# Patient Record
Sex: FEMALE | Race: BLACK OR AFRICAN AMERICAN | Employment: UNEMPLOYED | URBAN - METROPOLITAN AREA
[De-identification: names, ages, dates, MRNs, and addresses within clinical notes are randomized per-mention and may not be internally consistent; named-entity substitution may affect disease eponyms.]

---

## 2017-03-30 ENCOUNTER — OFFICE VISIT (OUTPATIENT)
Dept: NEUROLOGY | Age: 47
End: 2017-03-30

## 2017-03-30 VITALS
HEIGHT: 60 IN | SYSTOLIC BLOOD PRESSURE: 140 MMHG | OXYGEN SATURATION: 98 % | DIASTOLIC BLOOD PRESSURE: 80 MMHG | BODY MASS INDEX: 44.68 KG/M2 | WEIGHT: 227.6 LBS | TEMPERATURE: 98.3 F | HEART RATE: 74 BPM

## 2017-03-30 DIAGNOSIS — R40.4 TRANSIENT ALTERATION OF AWARENESS: ICD-10-CM

## 2017-03-30 DIAGNOSIS — R20.2 PARESTHESIA: ICD-10-CM

## 2017-03-30 DIAGNOSIS — R41.3 MEMORY LOSS: ICD-10-CM

## 2017-03-30 DIAGNOSIS — R51.9 CHRONIC DAILY HEADACHE: Primary | ICD-10-CM

## 2017-03-30 RX ORDER — TOPIRAMATE 100 MG/1
100 TABLET, COATED ORAL 2 TIMES DAILY
Qty: 60 TAB | Refills: 5 | Status: SHIPPED | OUTPATIENT
Start: 2017-03-30 | End: 2017-05-16 | Stop reason: SDUPTHER

## 2017-03-30 NOTE — PROGRESS NOTES
[facility]   [address] [facility], [address] [facility], [address]      Date:     Name:  Hansa Reynoso  :  1970  MRN:  [id number]     PCP:  No primary care provider on file. Chief Complaint   Patient presents with    Headache     Follow-up, denies currently         HISTORY OF PRESENT ILLNESS:  Patient reports continuing episodes of numbness starting in the time spreading down the anterior neck to the chest, lasting for hours approximately every night when going to bed   No new jaw clenchingShe did her cheeks that her tongue was numb. She did not lose consciousness. She reports her speech was off per her family members and she had a mild right frontal headache. She denied any postictal confusion. She had a similar episode several years ago with loss of consciousness. Workup at that time showed abnormal EEG, but negative MRI. Patient and was placed on Keppra, but did not tolerate. She reports improvement in headaches since institution of Topamax. She did worse off the Topamax and will wants to restart. Her primary care has given her 50 mg which she takes twice a day but still has headaches in the facial tingling. She still has lapses of awareness. She returns after an absence of over a year. She has  and can now afford the medication. She was on brand name Topamax for tolerance. she notes increase tingling with statin      General ROS: negative  Psychological ROS: positive for - hallucinations and memory difficulties  Cardiovascular ROS: no chest pain or dyspnea on exertion  Gastrointestinal ROS: no abdominal pain, change in bowel habits, or black or bloody stools  Musculoskeletal ROS: negative  Neurological ROS: positive for - dizziness, memory loss and numbness/tingling  Dermatological ROS: negative     Current Outpatient Prescriptions   Medication Sig    TOPAMAX 100 mg tablet Take 1 Tab by mouth two (2) times a day.     Omeprazole delayed release (PRILOSEC D/R) 20 mg tablet Take 20 mg by mouth.  temazepam (RESTORIL) 15 mg capsule Take  by mouth nightly as needed for Sleep.  QUEtiapine SR (SEROQUEL XR) 150 mg sr tablet Take 150 mg by mouth three (3) times daily.  fluticasone (FLONASE) 50 mcg/actuation nasal spray 2 Sprays by Both Nostrils route daily as needed for Allergies. Indications: ALLERGIC RHINITIS    OTHER     acetaminophen-codeine (TYLENOL #3) 300-30 mg per tablet Take 1 Tab by mouth every six (6) hours as needed for Pain. Max Daily Amount: 4 Tabs.  hydrochlorothiazide (HYDRODIURIL) 25 mg tablet TAKE ONE TABLET BY MOUTH ONCE DAILY    MULTIVITAMIN WITH MINERALS (HAIR,SKIN & NAILS PO) Take  by mouth.  valACYclovir (VALTREX) 500 mg tablet TAKE ONE CAPLET BY MOUTH ONCE DAILY APPOINTMENT REQUIRED BEFORE NEXT FILL.  haloperidol (HALDOL) 10 mg tablet Take 10 mg by mouth two (2) times a day. Indications: SCHIZOPHRENIA    benztropine (COGENTIN) 1 mg tablet Take  by mouth two (2) times a day. No current facility-administered medications for this visit. Allergies   Allergen Reactions    Latex Itching     Latex condoms    Zoloft [Sertraline] Other (comments)     Past Medical History:   Diagnosis Date    Abdominal pain     Anxiety disorder     panic attacks 7 y ago    Constipation     Crohn's disease ([address] 4/1/2010    Dizzy spells     Dyslipidemia     Echocardiogram 10/20/11    Normal. EF 60-65% no WMA    Genital HSV 4/1/2010    Headache(784.0)     Hearing reduced     Memory disorder     Psychiatric disorder     Schizophrenia ([address]     Syncope      Past Surgical History:   Procedure Laterality Date    HX HYSTERECTOMY  2001    HX TONSILLECTOMY  age 17     Social History     Social History    Marital status: SINGLE     Spouse name: N/A    Number of children: N/A    Years of education: N/A     Occupational History    Not on file.      Social History Main Topics    Smoking status: Never Smoker    Smokeless tobacco: Never Used   Hever Hocdipti Alcohol use No    Drug use: No    Sexual activity: Not Currently     Birth control/ protection: Surgical     Other Topics Concern    Caffeine Concern Yes     has decreased to 3 12oz cans of mountain dew    Sleep Concern No    Stress Concern No    Weight Concern No    Exercise Yes     2 days per week, walking    Seat Belt Yes    Self-Exams No     Social History Narrative     Family History   Problem Relation Age of Onset    Diabetes Mother     Arrhythmia Mother     Heart Disease Mother     Seizures Mother     Other Mother      sleep apnea    Stroke Mother     Headache Mother     Heart Disease Father      MI    Heart Attack Father     Hypertension Father     Stroke Father     Cancer Brother      prostate cancer?  Diabetes Brother     Cancer Maternal Aunt      breast cancer    Schizophrenia Maternal Aunt        PHYSICAL EXAMINATION:    Visit Vitals    /80    Pulse 74    Temp 98.3 °F (36.8 °C) (Oral)    Ht 5' (1.524 m)    Wt 103.2 kg (227 lb 9.6 oz)    SpO2 98%    BMI 44.45 kg/m2     General:  Well defined, nourished, and groomed individual in no acute distress. Neck: Supple, nontender, thyroid within normal limits,, no bruits, no pain with resistance to active range of motion. Heart: Regular rate and rhythm, no murmurs, rub, or gallop. Normal S1S2. Lungs:  Clear to auscultation bilaterally with equal chest expansion, no cough, no wheeze  Musculoskeletal:  Extremities revealed no edema and had full range of motion   Psych:  Good mood and bright affect    NEUROLOGICAL EXAMINATION:     Mental Status:   Alert and oriented to person, place, and time with fair recent and remote memory. Attention span and concentration are normal. Speech is fluent with a fair fund of knowledge. Cranial Nerves:    II, III, IV, VI:  Visual acuity grossly intact. Visual fields are normal.    Pupils are equal, round, and reactive to light and accommodation.     Extra-ocular movements are full and fluid. no ptosis or nystagmus. V-XII: Facial features are symmetric, with normal sensation and strength. Motor Examination: Normal tone, bulk, and strength, 5/5 muscle strength throughout. No cogwheel rigidity or clonus present. Coordination:  No resting or intention tremor. Normal gait    [place]:  Steady while walking on toes, heels, and with tandem walking. Normal arm swing. No muscle wasting or fasiculations noted. Lab/Data Review:  Reviewed er notes and studies    ASSESSMENT AND PLAN    ICD-10-CM ICD-9-CM    1. Chronic daily headache R51 784.0    2. Paresthesia R20.2 782.0    3. Memory loss R41.3 780.93    4. Transient alteration of awareness R40.4 780.02        Ladi Katherineamado a 42 y.o. y.o.  who has risk factors including abnormal eeg, chronic headaches and schizophrenia on atypical neuroleptics    Plan:   Reviewed work up  Recommended vit b2 coenzyme q10vit b12 for tingling and vit c if increased topamax resulted in increased tingling not just with headaches  Will increase topamax after warning  Of side effects of increased tingling  I spent 40 minutes with the patient in face-to-face consultation, of which greater than 50% was spent in counseling and coordination of care as described above.

## 2017-03-30 NOTE — MR AVS SNAPSHOT
Visit Information Date & Time Provider Department Dept. Phone Encounter #  
 3/30/2017  9:00 AM Darcie Jimenez  \[facility]\"" [address] [address] [id number] Follow-up Instructions Return in about 1 month (around 4/30/2017). Follow-up and Disposition History Your Appointments 5/16/2017  1:00 PM  
Follow Up with Darcie Jimenez MD  
[address]) Appt Note: 1mon f/u  
 [address] [facility] [address]  
[phone]  
  
   
 [facility] [address] Upcoming Health Maintenance Date Due INFLUENZA AGE 9 TO ADULT 8/1/2016 PAP AKA CERVICAL CYTOLOGY 9/22/2017 DTaP/Tdap/Td series (2 - Td) 11/20/2024 COLONOSCOPY 7/1/2026 Allergies as of 3/30/2017  Review Complete On: 3/30/2017 By: Darcie Jimenez MD  
  
 Severity Noted Reaction Type Reactions Latex  08/27/2010   Intolerance Itching Latex condoms Zoloft [Sertraline]  01/09/2012   Intolerance Other (comments) Current Immunizations  Never Reviewed Name Date Influenza Vaccine 9/19/2014 Tdap 11/20/2014 Not reviewed this visit You Were Diagnosed With   
  
 Codes Comments Chronic daily headache    -  Primary ICD-10-CM: S94 ICD-9-CM: 784.0 Paresthesia     ICD-10-CM: R20.2 ICD-9-CM: 782.0 Memory loss     ICD-10-CM: R41.3 ICD-9-CM: 780.93 Transient alteration of awareness     ICD-10-CM: R40.4 ICD-9-CM: 780.02 Vitals BP Pulse Temp Height(growth percentile) Weight(growth percentile) LMP  
 140/80 74 98.3 °F (36.8 °C) (Oral) 5' (1.524 m) 227 lb 9.6 oz (103.2 kg) 01/01/2001 SpO2 BMI OB Status Smoking Status 98% 44.45 kg/m2 Hysterectomy Never Smoker BMI and BSA Data Body Mass Index Body Surface Area 44.45 kg/m 2 2.09 m 2 Preferred Pharmacy Pharmacy Name Phone [facility] [address] [place] [address] [phone] Your Updated Medication List  
  
   
This list is accurate as of: 3/30/17 10:23 AM.  Always use your most recent med list.  
  
  
  
  
 acetaminophen-codeine 300-30 mg per tablet Commonly known as:  TYLENOL #3 Take 1 Tab by mouth every six (6) hours as needed for Pain. Max Daily Amount: 4 Tabs. benztropine 1 mg tablet Commonly known as:  COGENTIN Take  by mouth two (2) times a day. fluticasone 50 mcg/actuation nasal spray Commonly known as:  Isela Guild 2 Sprays by Both Nostrils route daily as needed for Allergies. Indications: ALLERGIC RHINITIS  
  
 HAIR,SKIN & NAILS PO Take  by mouth.  
  
 haloperidol 10 mg tablet Commonly known as:  HALDOL Take 10 mg by mouth two (2) times a day. Indications: SCHIZOPHRENIA  
  
 hydroCHLOROthiazide 25 mg tablet Commonly known as:  HYDRODIURIL  
TAKE ONE TABLET BY MOUTH ONCE DAILY Omeprazole delayed release 20 mg tablet Commonly known as:  PRILOSEC D/R Take 20 mg by mouth. OTHER  
  
 QUEtiapine  mg sr tablet Commonly known as:  SEROquel XR Take 150 mg by mouth three (3) times daily. temazepam 15 mg capsule Commonly known as:  RESTORIL Take  by mouth nightly as needed for Sleep. TOPAMAX 100 mg tablet Generic drug:  topiramate Take 1 Tab by mouth two (2) times a day. valACYclovir 500 mg tablet Commonly known as:  VALTREX  
TAKE ONE CAPLET BY MOUTH ONCE DAILY APPOINTMENT REQUIRED BEFORE NEXT FILL. Prescriptions Sent to Pharmacy Refills TOPAMAX 100 mg tablet 5 Sig: Take 1 Tab by mouth two (2) times a day. Class: Normal  
 Pharmacy: [facility]  #: [phone] Route: Oral  
  
Follow-up Instructions Return in about 1 month (around 4/30/2017). [facility] & [facility] SERVICES! [employer] introduces [employer] patient portal. Now you can access parts of your medical record, email your doctor's office, and request medication refills online. 1. In your internet browser, go to [email or web address] 2. Click on the First Time User? Click Here link in the Sign In box. You will see the New Member Sign Up page. 3. Enter your StreamStar Access Code exactly as it appears below. You will not need to use this code after youve completed the sign-up process. If you do not sign up before the expiration date, you must request a new code. · Stream[employer] Access Code: [email or web address] Expires: 6/28/2017  9:05 AM 
 
4. Enter the last four digits of your Social Security Number (xxxx) and Date of Birth (mm/dd/yyyy) as indicated and click Submit. You will be taken to the next sign-up page. 5. Create a StreamStar ID. This will be your StreamStar login ID and cannot be changed, so think of one that is secure and easy to remember. 6. Create a StreamStar password. You can change your password at any time. 7. Enter your Password Reset Question and Answer. This can be used at a later time if you forget your password. 8. Enter your e-mail address. You will receive e-mail notification when new information is available in [address] 9. Click Sign Up. You can now view and download portions of your medical record. 10. Click the Download Summary menu link to download a portable copy of your medical information. If you have questions, please visit the Frequently Asked Questions section of the StreamStar website. Remember, StreamStar is NOT to be used for urgent needs. For medical emergencies, dial 911. Now available from your iPhone and Android! Please provide this summary of care documentation to your next provider. If you have any questions after today's visit, please call [phone].

## 2017-05-16 ENCOUNTER — OFFICE VISIT (OUTPATIENT)
Dept: NEUROLOGY | Age: 47
End: 2017-05-16

## 2017-05-16 VITALS
OXYGEN SATURATION: 98 % | TEMPERATURE: 98.2 F | WEIGHT: 221.4 LBS | HEART RATE: 78 BPM | HEIGHT: 60 IN | DIASTOLIC BLOOD PRESSURE: 60 MMHG | BODY MASS INDEX: 43.47 KG/M2 | SYSTOLIC BLOOD PRESSURE: 120 MMHG

## 2017-05-16 DIAGNOSIS — R51.9 CHRONIC DAILY HEADACHE: Primary | ICD-10-CM

## 2017-05-16 DIAGNOSIS — R20.2 PARESTHESIA: ICD-10-CM

## 2017-05-16 DIAGNOSIS — R41.3 MEMORY LOSS: ICD-10-CM

## 2017-05-16 RX ORDER — TOPIRAMATE 100 MG/1
100 TABLET, COATED ORAL 2 TIMES DAILY
Qty: 60 TAB | Refills: 5 | Status: SHIPPED | OUTPATIENT
Start: 2017-05-16 | End: 2017-08-31 | Stop reason: SDUPTHER

## 2017-05-16 RX ORDER — MAGNESIUM 200 MG
1000 TABLET ORAL DAILY
COMMUNITY

## 2017-05-16 NOTE — MR AVS SNAPSHOT
Visit Information Date & Time Provider Department Dept. Phone Encounter #  
 5/16/2017  1:00 PM Laura Nagy [facility] [phone] [id number] Follow-up Instructions Return in about 4 months (around 9/16/2017). Follow-up and Disposition History Your Appointments 9/14/2017  9:45 AM  
Follow Up with Laura Nagy MD  
[facility] Appt Note: 4mon f/u  
 [address] [place] [address] [place] [address] [phone]  
  
   
 [place] [address] Upcoming Health Maintenance Date Due INFLUENZA AGE 9 TO ADULT 8/1/2017 PAP AKA CERVICAL CYTOLOGY 9/22/2017 DTaP/Tdap/Td series (2 - Td) 11/20/2024 COLONOSCOPY 7/1/2026 Allergies as of 5/16/2017  Review Complete On: 5/16/2017 By: Laura Nagy MD  
  
 Severity Noted Reaction Type Reactions Latex  08/27/2010   Intolerance Itching Latex condoms Zoloft [Sertraline]  01/09/2012   Intolerance Other (comments) Current Immunizations  Never Reviewed Name Date Influenza Vaccine 9/19/2014 Tdap 11/20/2014 Not reviewed this visit You Were Diagnosed With   
  
 Codes Comments Chronic daily headache    -  Primary ICD-10-CM: O53 ICD-9-CM: 784.0 Paresthesia     ICD-10-CM: R20.2 ICD-9-CM: 782.0 Memory loss     ICD-10-CM: R41.3 ICD-9-CM: 780.93 Vitals BP Pulse Temp Height(growth percentile) Weight(growth percentile) LMP  
 120/60 78 98.2 °F (36.8 °C) (Oral) 5' (1.524 m) 221 lb 6.4 oz (100.4 kg) 01/01/2001 SpO2 BMI OB Status Smoking Status 98% 43.24 kg/m2 Hysterectomy Never Smoker BMI and BSA Data Body Mass Index Body Surface Area  
 43.24 kg/m 2 2.06 m 2 Preferred Pharmacy Pharmacy Name Phone [facility] [address] [phone] Your Updated Medication List  
  
   
 This list is accurate as of: 5/16/17  1:45 PM.  Always use your most recent med list.  
  
  
  
  
 acetaminophen-codeine 300-30 mg per tablet Commonly known as:  TYLENOL #3 Take 1 Tab by mouth every six (6) hours as needed for Pain. Max Daily Amount: 4 Tabs. benztropine 1 mg tablet Commonly known as:  COGENTIN Take  by mouth two (2) times a day.  
  
 ezetimibe 10 mg tab 10 mg, simvastatin 20 mg tab 40 mg Take  by mouth every evening. fluticasone 50 mcg/actuation nasal spray Commonly known as:  Emily Earnest 2 Sprays by Both Nostrils route daily as needed for Allergies. Indications: ALLERGIC RHINITIS  
  
 HAIR,SKIN & NAILS PO Take  by mouth.  
  
 haloperidol 10 mg tablet Commonly known as:  HALDOL Take 10 mg by mouth two (2) times a day. Indications: SCHIZOPHRENIA  
  
 hydroCHLOROthiazide 25 mg tablet Commonly known as:  HYDRODIURIL  
TAKE ONE TABLET BY MOUTH ONCE DAILY Omeprazole delayed release 20 mg tablet Commonly known as:  PRILOSEC D/R Take 20 mg by mouth. QUEtiapine  mg sr tablet Commonly known as:  SEROquel XR Take 150 mg by mouth three (3) times daily. temazepam 15 mg capsule Commonly known as:  RESTORIL Take  by mouth nightly as needed for Sleep. TOPAMAX 100 mg tablet Generic drug:  topiramate Take 1 Tab by mouth two (2) times a day. valACYclovir 500 mg tablet Commonly known as:  VALTREX  
TAKE ONE CAPLET BY MOUTH ONCE DAILY APPOINTMENT REQUIRED BEFORE NEXT FILL. VITAMIN B-12 1,000 mcg sublingual tablet Generic drug:  cyanocobalamin Take 1,000 mcg by mouth daily. Prescriptions Sent to Pharmacy Refills TOPAMAX 100 mg tablet 5 Sig: Take 1 Tab by mouth two (2) times a day. Class: Normal  
 Pharmacy: [facility] Ph #: [phone] Route: Oral  
  
Follow-up Instructions Return in about 4 months (around 9/16/2017). Introducing [facility] & HEALTH SERVICES! Darcy Ru[employer] introduces [employer]â„¢ patient portal. Now you can access parts of your medical record, email your doctor's office, and request medication refills online. 1. In your internet browser, go to [email or web address] 2. Click on the First Time User? Click Here link in the Sign In box. You will see the New Member Sign Up page. 3. Enter your [employer]â„¢ Access Code exactly as it appears below. You will not need to use this code after youve completed the sign-up process. If you do not sign up before the expiration date, you must request a new code. · [employer]â„¢ Access Code: [email or web address] Expires: 6/28/2017  9:05 AM 
 
4. Enter the last four digits of your Social Security Number (xxxx) and Date of Birth (mm/dd/yyyy) as indicated and click Submit. You will be taken to the next sign-up page. 5. Create a [employer]â„¢ ID. This will be your [employer]â„¢ login ID and cannot be changed, so think of one that is secure and easy to remember. 6. Create a [employer]â„¢ password. You can change your password at any time. 7. Enter your Password Reset Question and Answer. This can be used at a later time if you forget your password. 8. Enter your e-mail address. You will receive e-mail notification when new information is available in [address] 9. Click Sign Up. You can now view and download portions of your medical record. 10. Click the Download Summary menu link to download a portable copy of your medical information. If you have questions, please visit the Frequently Asked Questions section of the [employer]â„¢ website. Remember, [employer]â„¢ is NOT to be used for urgent needs. For medical emergencies, dial 911. Now available from your iPhone and Android! Please provide this summary of care documentation to your next provider. If you have any questions after today's visit, please call [phone].

## 2017-05-16 NOTE — PROGRESS NOTES
[address], [address] [place] [address], [address]      Date:     Name:  Rosita Carey  :  1970  MRN:  [id number]     PCP:  No primary care provider on file. Chief Complaint   Patient presents with    Headache     Follow-up         HISTORY OF PRESENT ILLNESS:  Patient reports continuing episodes of numbness starting in the time spreading down the anterior neck to the chest, lasting for hours approximately every night when going to bed   No new jaw clenchingShe did her cheeks that her tongue was numb. She did not lose consciousness. She reports her speech was off per her family members and she had a mild right frontal headache. She denied any postictal confusion. She had a similar episode several years ago with loss of consciousness. Workup at that time showed abnormal EEG, but negative MRI. Patient and was placed on Keppra, but did not tolerate. She reports improvement in headaches since institution of Topamax. She did worse off the Topamax and will wants to restart. Her primary care has given her 50 mg which she takes twice a day but still has headaches in the facial tingling. She still has lapses of awareness. She returns after an absence of over a year. She has  and can now afford the medication. She was on brand name Topamax for tolerance. she notes increase tingling with statin    She reports control of headaches with topamax but increased intermittent tingling with statin reviewed need for dietary supplements   General ROS: negative  Psychological ROS: positive for - hallucinations and memory difficulties  Cardiovascular ROS: no chest pain or dyspnea on exertion  Gastrointestinal ROS: no abdominal pain, change in bowel habits, or black or bloody stools  Musculoskeletal ROS: negative  Neurological ROS: positive for - dizziness, memory loss and numbness/tingling  Dermatological ROS: negative     Current Outpatient Prescriptions   Medication Sig    cyanocobalamin (VITAMIN B-12) 1,000 mcg sublingual tablet Take 1,000 mcg by mouth daily.  ezetimibe 10 mg tab 10 mg, simvastatin 20 mg tab 40 mg Take  by mouth every evening.  TOPAMAX 100 mg tablet Take 1 Tab by mouth two (2) times a day.  valACYclovir (VALTREX) 500 mg tablet TAKE ONE CAPLET BY MOUTH ONCE DAILY APPOINTMENT REQUIRED BEFORE NEXT FILL.  Omeprazole delayed release (PRILOSEC D/R) 20 mg tablet Take 20 mg by mouth.  temazepam (RESTORIL) 15 mg capsule Take  by mouth nightly as needed for Sleep.  QUEtiapine SR (SEROQUEL XR) 150 mg sr tablet Take 150 mg by mouth three (3) times daily.  fluticasone (FLONASE) 50 mcg/actuation nasal spray 2 Sprays by Both Nostrils route daily as needed for Allergies. Indications: ALLERGIC RHINITIS    acetaminophen-codeine (TYLENOL #3) 300-30 mg per tablet Take 1 Tab by mouth every six (6) hours as needed for Pain. Max Daily Amount: 4 Tabs.  hydrochlorothiazide (HYDRODIURIL) 25 mg tablet TAKE ONE TABLET BY MOUTH ONCE DAILY    MULTIVITAMIN WITH MINERALS (HAIR,SKIN & NAILS PO) Take  by mouth.  benztropine (COGENTIN) 1 mg tablet Take  by mouth two (2) times a day.  haloperidol (HALDOL) 10 mg tablet Take 10 mg by mouth two (2) times a day. Indications: SCHIZOPHRENIA     No current facility-administered medications for this visit.       Allergies   Allergen Reactions    Latex Itching     Latex condoms    Zoloft [Sertraline] Other (comments)     Past Medical History:   Diagnosis Date    Abdominal pain     Anxiety disorder     panic attacks 7 y ago    Constipation     Crohn's disease ([address] 4/1/2010    Dizzy spells     Dyslipidemia     Echocardiogram 10/20/11    Normal. EF 60-65% no WMA    Genital HSV 4/1/2010    Headache     Hearing reduced     Memory disorder     Psychiatric disorder     Schizophrenia ([address]     Syncope      Past Surgical History:   Procedure Laterality Date    HX HYSTERECTOMY  2001    HX TONSILLECTOMY  age 17     Social History     Social History    Marital status: SINGLE     Spouse name: N/A    Number of children: N/A    Years of education: N/A     Occupational History    Not on file. Social History Main Topics    Smoking status: Never Smoker    Smokeless tobacco: Never Used    Alcohol use No    Drug use: No    Sexual activity: Not Currently     Birth control/ protection: Surgical     Other Topics Concern    Caffeine Concern Yes     has decreased to 3 12oz cans of mountain dew    Sleep Concern No    Stress Concern No    Weight Concern No    Exercise Yes     2 days per week, walking    Seat Belt Yes    Self-Exams No     Social History Narrative     Family History   Problem Relation Age of Onset    Diabetes Mother     Arrhythmia Mother     Heart Disease Mother     Seizures Mother     Other Mother      sleep apnea    Stroke Mother     Headache Mother     Heart Disease Father      MI    Heart Attack Father     Hypertension Father     Stroke Father     Cancer Brother      prostate cancer?  Diabetes Brother     Cancer Maternal Aunt      breast cancer    Schizophrenia Maternal Aunt        PHYSICAL EXAMINATION:    Visit Vitals    /60    Pulse 78    Temp 98.2 °F (36.8 °C) (Oral)    Ht 5' (1.524 m)    Wt 100.4 kg (221 lb 6.4 oz)    SpO2 98%    BMI 43.24 kg/m2     General:  Well defined, nourished, and groomed individual in no acute distress. Neck: Supple, nontender, thyroid within normal limits,, no bruits, no pain with resistance to active range of motion. Heart: Regular rate and rhythm, no murmurs, rub, or gallop. Normal S1S2. Lungs:  Clear to auscultation bilaterally with equal chest expansion, no cough, no wheeze  Musculoskeletal:  Extremities revealed no edema and had full range of motion   Psych:  Good mood and bright affect    NEUROLOGICAL EXAMINATION:     Mental Status:   Alert and oriented to person, place, and time with fair recent and remote memory.   Attention span and concentration are normal. Speech is fluent with a fair fund of knowledge. Cranial Nerves:    II, III, IV, VI:  Visual acuity grossly intact. Visual fields are normal.    Pupils are equal, round, and reactive to light and accommodation. Extra-ocular movements are full and fluid. no ptosis or nystagmus. V-XII: Facial features are symmetric, with normal sensation and strength. Motor Examination: Normal tone, bulk, and strength, 5/5 muscle strength throughout. No cogwheel rigidity or clonus present. Coordination:  No resting or intention tremor. Normal gait    Gaiit and Station:  Steady while walking . Normal arm swing. No muscle wasting or fasiculations noted. Lab/Data Review:  Reviewed er notes and studies    ASSESSMENT AND PLAN    ICD-10-CM ICD-9-CM    1. Chronic daily headache R51 784.0    2. Paresthesia R20.2 782.0    3. Memory loss R41.3 780.93        Skippy Spindle a 42 y.o. y.o.  who has risk factors including abnormal eeg, chronic headaches and schizophrenia on atypical neuroleptics    Plan:   Reviewed work up  Recommended vit b2 coenzyme q10vit b12 for tingling and vit c if increased topamax resulted in increased tingling not just with headaches  Will continue topamax after warning  Of side effects of increased tingling  I spent 30 minutes with the patient in face-to-face consultation, of which greater than 50% was spent in counseling and coordination of care as described above.

## 2017-08-31 ENCOUNTER — OFFICE VISIT (OUTPATIENT)
Dept: NEUROLOGY | Age: 47
End: 2017-08-31

## 2017-08-31 VITALS
TEMPERATURE: 98.8 F | HEART RATE: 98 BPM | WEIGHT: 224.8 LBS | DIASTOLIC BLOOD PRESSURE: 86 MMHG | HEIGHT: 60 IN | BODY MASS INDEX: 44.14 KG/M2 | RESPIRATION RATE: 18 BRPM | OXYGEN SATURATION: 98 % | SYSTOLIC BLOOD PRESSURE: 140 MMHG

## 2017-08-31 DIAGNOSIS — F20.3 UNDIFFERENTIATED SCHIZOPHRENIA (HCC): ICD-10-CM

## 2017-08-31 DIAGNOSIS — R20.2 PARESTHESIA: ICD-10-CM

## 2017-08-31 DIAGNOSIS — R51.9 CHRONIC DAILY HEADACHE: Primary | ICD-10-CM

## 2017-08-31 RX ORDER — HYDROXYZINE PAMOATE 25 MG/1
CAPSULE ORAL
Qty: 40 CAP | Refills: 2 | Status: SHIPPED | OUTPATIENT
Start: 2017-08-31 | End: 2017-09-05

## 2017-08-31 RX ORDER — TOPIRAMATE 100 MG/1
TABLET, COATED ORAL
Qty: 90 TAB | Refills: 5 | Status: SHIPPED | OUTPATIENT
Start: 2017-08-31 | End: 2017-09-07 | Stop reason: SDUPTHER

## 2017-08-31 NOTE — PROGRESS NOTES
[address] [facility]   [address] [place] [address], [address] [facility], [address] [facility] [address]      Date:     Name:  Sara Alba  :  1970  MRN:  [id number]     PCP:  No primary care provider on file. Chief Complaint   Patient presents with    Neurologic Problem     F/U headaches         HISTORY OF PRESENT ILLNESS:  Patient reports continuing episodes of numbness starting in the time spreading down the anterior neck to the chest, lasting for hours approximately every night when going to bed   No new jaw clenchingShe did her cheeks that her tongue was numb. She did not lose consciousness. She reports her speech was off per her family members and she had a mild right frontal headache. She denied any postictal confusion. She had a similar episode several years ago with loss of consciousness. Workup at that time showed abnormal EEG, but negative MRI. Patient and was placed on Keppra, but did not tolerate. She reports improvement in headaches since institution of Topamax. She did worse off the Topamax and will wants to restart. Her primary care has given her 50 mg which she takes twice a day but still has headaches in the facial tingling. She still has lapses of awareness. She returns after an absence of over a year. She has  and can now afford the medication. She was on brand name Topamax for tolerance. she notes increase tingling with statin    She reports control of headaches with topamax but increased intermittent tingling with statin reviewed need for dietary supplements     Patient reports only 3 significant headaches month but wanted something for the headaches are acute treatment reviewed risks and benefits of therapy such as Vistaril given her multiple other medications. She now also reports some tingling in the lips with use of extended release decongestant.   Discussed side effects of decongestant therapy  General ROS: negative  Psychological ROS: positive for - hallucinations and memory difficulties  Cardiovascular ROS: no chest pain or dyspnea on exertion  Gastrointestinal ROS: no abdominal pain, change in bowel habits, or black or bloody stools  Musculoskeletal ROS: negative  Neurological ROS: positive for - dizziness, memory loss and numbness/tingling  Dermatological ROS: negative     Current Outpatient Prescriptions   Medication Sig    TOPAMAX 100 mg tablet 1 q am 2 qhs    hydrOXYzine pamoate (VISTARIL) 25 mg capsule 1-2 q onset of severe headache, no more than 3/day    cyanocobalamin (VITAMIN B-12) 1,000 mcg sublingual tablet Take 1,000 mcg by mouth daily.  ezetimibe 10 mg tab 10 mg, simvastatin 20 mg tab 40 mg Take  by mouth every evening.  valACYclovir (VALTREX) 500 mg tablet TAKE ONE CAPLET BY MOUTH ONCE DAILY APPOINTMENT REQUIRED BEFORE NEXT FILL.  Omeprazole delayed release (PRILOSEC D/R) 20 mg tablet Take 20 mg by mouth.  temazepam (RESTORIL) 15 mg capsule Take  by mouth nightly as needed for Sleep.  haloperidol (HALDOL) 10 mg tablet Take 10 mg by mouth two (2) times a day. Indications: SCHIZOPHRENIA    QUEtiapine SR (SEROQUEL XR) 150 mg sr tablet Take 300 mg by mouth nightly. Indications: SCHIZOPHRENIA    fluticasone (FLONASE) 50 mcg/actuation nasal spray 2 Sprays by Both Nostrils route daily as needed for Allergies. Indications: ALLERGIC RHINITIS    acetaminophen-codeine (TYLENOL #3) 300-30 mg per tablet Take 1 Tab by mouth every six (6) hours as needed for Pain. Max Daily Amount: 4 Tabs.  hydrochlorothiazide (HYDRODIURIL) 25 mg tablet TAKE ONE TABLET BY MOUTH ONCE DAILY    MULTIVITAMIN WITH MINERALS (HAIR,SKIN & NAILS PO) Take  by mouth.  benztropine (COGENTIN) 1 mg tablet Take  by mouth two (2) times a day. No current facility-administered medications for this visit.       Allergies   Allergen Reactions    Latex Itching     Latex condoms    Zoloft [Sertraline] Other (comments)     Past Medical History:   Diagnosis Date    Abdominal pain     Anxiety disorder     panic attacks 7 y ago    Constipation     [address]'s [address] ([facility] 4/1/2010    Dizzy spells     Dyslipidemia     Echocardiogram 10/20/11    Normal. EF 60-65% no WMA    Genital HSV 4/1/2010    Headache     Hearing reduced     Memory disorder     Psychiatric disorder     Schizophrenia ([facility]     Syncope      Past Surgical History:   Procedure Laterality Date    HX HYSTERECTOMY  2001    HX TONSILLECTOMY  age 17     Social History     Social History    Marital status: SINGLE     Spouse name: N/A    Number of children: N/A    Years of education: N/A     Occupational History    Not on file. Social History Main Topics    Smoking status: Never Smoker    Smokeless tobacco: Never Used    Alcohol use No    Drug use: No    Sexual activity: Not Currently     Birth control/ protection: Surgical     Other Topics Concern    Caffeine Concern Yes     has decreased to 3 12oz cans of mountain dew    Sleep Concern No    Stress Concern No    Weight Concern No    Exercise Yes     2 days per week, walking    Seat Belt Yes    Self-Exams No     Social History Narrative     Family History   Problem Relation Age of Onset    Diabetes Mother     Arrhythmia Mother     Heart Disease Mother     Seizures Mother     Other Mother      sleep apnea    Stroke Mother     Headache Mother     Heart Disease Father      MI    Heart Attack Father     Hypertension Father     Stroke Father     Cancer Brother      prostate cancer?  Diabetes Brother     Cancer Maternal Aunt      breast cancer    Schizophrenia Maternal Aunt        PHYSICAL EXAMINATION:    Visit Vitals    /86    Pulse 98    Temp 98.8 °F (37.1 °C) (Oral)    Resp 18    Ht 5' (1.524 m)    Wt 102 kg (224 lb 12.8 oz)    SpO2 98%    BMI 43.9 kg/m2     General:  Well defined, nourished, and groomed individual in no acute distress.     Neck: Supple, nontender, thyroid within normal limits,, no bruits, no pain with resistance to active range of motion. Heart: Regular rate and rhythm, no murmurs, rub, or gallop. Normal S1S2. Lungs:  Clear to auscultation bilaterally with equal chest expansion, no cough, no wheeze  Musculoskeletal:  Extremities revealed no edema and had full range of motion   Psych:  Good mood and bright affect    NEUROLOGICAL EXAMINATION:     Mental Status:   Alert and oriented to person, place, and time with fair recent and remote memory. Attention span and concentration are normal. Speech is fluent with a fair fund of knowledge. Cranial Nerves:    II, III, IV, VI:  Visual acuity grossly intact. Visual fields are normal.    Pupils are equal, round, and reactive to light and accommodation. Extra-ocular movements are full and fluid. no ptosis or nystagmus. V-XII: Facial features are symmetric, with normal sensation and strength. Motor Examination: Normal tone, bulk, and strength, 5/5 muscle strength throughout. No cogwheel rigidity or clonus present. Coordination:  No resting or intention tremor. Normal gait    Gaiit and Station:  Steady while walking . Normal arm swing. No muscle wasting or fasiculations noted. Lab/Data Review:  Reviewed er notes and studies    ASSESSMENT AND PLAN    ICD-10-CM ICD-9-CM    1. Chronic daily headache R51 784.0    2. Paresthesia R20.2 782.0    3. Undifferentiated schizophrenia ([email or web address] F20.3 295.90        Amber Cameron a 42 y.o. y.o.  who has risk factors including abnormal eeg, chronic headaches and schizophrenia on atypical neuroleptics    Plan:   Reviewed work up  Recommended vit b2 coenzyme q10vit b12 for tingling Will  Increase topamax after warning  Of side effects of increased tingling will try vistaril for acute headache  I spent 40 minutes with the patient in face-to-face consultation, of which greater than 50% was spent in counseling and coordination of care as described above.

## 2017-08-31 NOTE — MR AVS SNAPSHOT
Visit Information Date & Time Provider Department Dept. Phone Encounter #  
 8/31/2017  9:00 AM Lobo Garcia, [address] [id number] Follow-up Instructions Return in about 6 months (around 2/28/2018). Follow-up and Disposition History Your Appointments 3/5/2018 10:00 AM  
Follow Up with Lobo Garcia MD  
[facility]) Appt Note: 6mon f/u  
 [address] [address] [phone]  
  
   
 [facility] [address] Upcoming Health Maintenance Date Due INFLUENZA AGE 9 TO ADULT 8/1/2017 PAP AKA CERVICAL CYTOLOGY 9/22/2017 DTaP/Tdap/Td series (2 - Td) 11/20/2024 COLONOSCOPY 7/1/2026 Allergies as of 8/31/2017  Review Complete On: 8/31/2017 By: Lobo Garcia MD  
  
 Severity Noted Reaction Type Reactions Latex  08/27/2010   Intolerance Itching Latex condoms Zoloft [Sertraline]  01/09/2012   Intolerance Other (comments) Current Immunizations  Never Reviewed Name Date Influenza Vaccine 9/19/2014 Tdap 11/20/2014 Not reviewed this visit You Were Diagnosed With   
  
 Codes Comments Chronic daily headache    -  Primary ICD-10-CM: G77 ICD-9-CM: 784.0 Paresthesia     ICD-10-CM: R20.2 ICD-9-CM: 782.0 Undifferentiated schizophrenia ([facility] [facility]     ICD-10-CM: F20.3 ICD-9-CM: 295.90 Vitals BP Pulse Temp Resp Height(growth percentile) Weight(growth percentile) 140/86 98 98.8 °F (37.1 °C) (Oral) 18 5' (1.524 m) 224 lb 12.8 oz (102 kg) LMP SpO2 BMI OB Status Smoking Status 01/01/2001 98% 43.9 kg/m2 Hysterectomy Never Smoker BMI and BSA Data Body Mass Index Body Surface Area 43.9 kg/m 2 2.08 m 2 Preferred Pharmacy Pharmacy Name Phone [facility] [address] [phone] Your Updated Medication List  
  
   
This list is accurate as of: 17  9:51 AM.  Always use your most recent med list.  
  
  
  
  
 acetaminophen-codeine 300-30 mg per tablet Commonly known as:  TYLENOL #3 Take 1 Tab by mouth every six (6) hours as needed for Pain. Max Daily Amount: 4 Tabs. benztropine 1 mg tablet Commonly known as:  COGENTIN Take  by mouth two (2) times a day.  
  
 ezetimibe 10 mg tab 10 mg, simvastatin 20 mg tab 40 mg Take  by mouth every evening. fluticasone 50 mcg/actuation nasal spray Commonly known as:  Morelia Courser 2 Sprays by Both Nostrils route daily as needed for Allergies. Indications: ALLERGIC RHINITIS  
  
 HAIR,SKIN & NAILS PO Take  by mouth.  
  
 haloperidol 10 mg tablet Commonly known as:  HALDOL Take 10 mg by mouth two (2) times a day. Indications: SCHIZOPHRENIA  
  
 hydroCHLOROthiazide 25 mg tablet Commonly known as:  HYDRODIURIL  
TAKE ONE TABLET BY MOUTH ONCE DAILY  
  
 hydrOXYzine pamoate 25 mg capsule Commonly known as:  VISTARIL  
1-2 q onset of severe headache, no more than 3/day Omeprazole delayed release 20 mg tablet Commonly known as:  PRILOSEC D/R Take 20 mg by mouth. QUEtiapine  mg sr tablet Commonly known as:  SEROquel XR Take 300 mg by mouth nightly. Indications: SCHIZOPHRENIA  
  
 temazepam 15 mg capsule Commonly known as:  RESTORIL Take  by mouth nightly as needed for Sleep. TOPAMAX 100 mg tablet Generic drug:  topiramate 1 q am 2 qhs  
  
 valACYclovir 500 mg tablet Commonly known as:  VALTREX  
TAKE ONE CAPLET BY MOUTH ONCE DAILY APPOINTMENT REQUIRED BEFORE NEXT FILL. VITAMIN B-12 1,000 mcg sublingual tablet Generic drug:  cyanocobalamin Take 1,000 mcg by mouth daily. Prescriptions Sent to Pharmacy Refills TOPAMAX 100 mg tablet 5  Si q am 2 qhs  
 Class: Normal  
 Pharmacy: [address] Denisa Baltazar [address]  #: [phone]  
 hydrOXYzine pamoate (VISTARIL) 25 mg capsule 2 Si-2 q onset of severe headache, no more than 3/day Class: Normal  
 Pharmacy: [address] Ph #: [phone] Follow-up Instructions Return in about 6 months (around 2018). Patient Instructions Patient to take vistaril prn severe headache not with other sedating medications Introducing [facility]! Adriannaleanna Cunningham introduces [employer] patient portal. Now you can access parts of your medical record, email your doctor's office, and request medication refills online. 1. In your internet browser, go to [email or web address] 2. Click on the First Time User? Click Here link in the Sign In box. You will see the New Member Sign Up page. 3. Enter your [employer] Access Code exactly as it appears below. You will not need to use this code after youve completed the sign-up process. If you do not sign up before the expiration date, you must request a new code. · [employer] Access Code: [email or web address] Expires: 2017  9:03 AM 
 
4. Enter the last four digits of your Social Security Number (xxxx) and Date of Birth (mm/dd/yyyy) as indicated and click Submit. You will be taken to the next sign-up page. 5. Create a [employer] ID. This will be your [employer] login ID and cannot be changed, so think of one that is secure and easy to remember. 6. Create a [employer] password. You can change your password at any time. 7. Enter your Password Reset Question and Answer. This can be used at a later time if you forget your password. 8. Enter your e-mail address. You will receive e-mail notification when new information is available in [address] 9. Click Sign Up. You can now view and download portions of your medical record. 10. Click the Download Summary menu link to download a portable copy of your medical information. If you have questions, please visit the Frequently Asked Questions section of the [employer] website. Remember, [employer] is NOT to be used for urgent needs. For medical emergencies, dial 911. Now available from your iPhone and Android! Please provide this summary of care documentation to your next provider. If you have any questions after today's visit, please call [phone].

## 2017-09-07 NOTE — TELEPHONE ENCOUNTER
Requested Prescriptions     Pending Prescriptions Disp Refills    TOPAMAX 100 mg tablet 90 Tab 5     Si q am 2 qhs     Refill request scanned to connect care

## 2017-09-09 RX ORDER — TOPIRAMATE 100 MG/1
TABLET, COATED ORAL
Qty: 270 TAB | Refills: 1 | Status: SHIPPED | OUTPATIENT
Start: 2017-09-09 | End: 2018-02-02 | Stop reason: SDUPTHER

## 2017-09-11 PROBLEM — D49.89: Status: ACTIVE | Noted: 2017-09-11

## 2017-09-24 PROBLEM — I31.39 PERICARDIAL EFFUSION: Status: ACTIVE | Noted: 2017-09-24

## 2017-10-10 PROBLEM — J90 PLEURAL EFFUSION, BILATERAL: Status: ACTIVE | Noted: 2017-10-10

## 2018-02-02 RX ORDER — TOPIRAMATE 100 MG/1
TABLET, COATED ORAL
Qty: 270 TAB | Refills: 1 | Status: SHIPPED | OUTPATIENT
Start: 2018-02-02 | End: 2018-04-05 | Stop reason: SDUPTHER

## 2018-04-05 ENCOUNTER — OFFICE VISIT (OUTPATIENT)
Dept: NEUROLOGY | Age: 48
End: 2018-04-05

## 2018-04-05 VITALS
HEIGHT: 62 IN | HEART RATE: 85 BPM | TEMPERATURE: 98.4 F | WEIGHT: 231.6 LBS | OXYGEN SATURATION: 99 % | SYSTOLIC BLOOD PRESSURE: 130 MMHG | BODY MASS INDEX: 42.62 KG/M2 | DIASTOLIC BLOOD PRESSURE: 90 MMHG | RESPIRATION RATE: 18 BRPM

## 2018-04-05 DIAGNOSIS — R51.9 CHRONIC INTRACTABLE HEADACHE, UNSPECIFIED HEADACHE TYPE: ICD-10-CM

## 2018-04-05 DIAGNOSIS — R68.89 SPELLS OF DECREASED ATTENTIVENESS: ICD-10-CM

## 2018-04-05 DIAGNOSIS — R41.3 MEMORY IMPAIRMENT: ICD-10-CM

## 2018-04-05 DIAGNOSIS — R42 LIGHTHEADEDNESS: ICD-10-CM

## 2018-04-05 DIAGNOSIS — R68.89 SPELLS OF DECREASED ATTENTIVENESS: Primary | ICD-10-CM

## 2018-04-05 DIAGNOSIS — G89.29 CHRONIC INTRACTABLE HEADACHE, UNSPECIFIED HEADACHE TYPE: ICD-10-CM

## 2018-04-05 RX ORDER — TOPIRAMATE 100 MG/1
TABLET, COATED ORAL
Qty: 270 TAB | Refills: 1 | Status: SHIPPED | OUTPATIENT
Start: 2018-04-05 | End: 2018-08-21 | Stop reason: SDUPTHER

## 2018-04-05 NOTE — MR AVS SNAPSHOT
[address] [address] [phone] Patient: Mary Butts MRN: [id number] LTV:[id number]/3/2240 Visit Information Date & Time Provider Department Dept. Phone Encounter #  
 4/5/2018  9:45 AM Earl Schilder, NP [facility] [phone] [id number] Upcoming Health Maintenance Date Due Influenza Age 5 to Adult 8/1/2017 PAP AKA CERVICAL CYTOLOGY 9/22/2017 MEDICARE YEARLY EXAM 3/15/2018 DTaP/Tdap/Td series (2 - Td) 11/20/2024 COLONOSCOPY 7/1/2026 Allergies as of 4/5/2018  Review Complete On: 4/5/2018 By: Nancy Cazares LPN Severity Noted Reaction Type Reactions Latex  08/27/2010   Intolerance Itching Latex condoms Zoloft [Sertraline]  01/09/2012   Intolerance Other (comments) Other reaction(s): Other (comments) 
dizziness Current Immunizations  Never Reviewed Name Date Influenza Vaccine 9/19/2014 Tdap 11/20/2014 Not reviewed this visit You Were Diagnosed With   
  
 Codes Comments Spells of decreased attentiveness    -  Primary ICD-10-CM: R68.89 ICD-9-CM: 780.99 Lightheadedness     ICD-10-CM: L97 ICD-9-CM: 780. 4 Chronic intractable headache, unspecified headache type     ICD-10-CM: R51 ICD-9-CM: 784.0 Memory impairment     ICD-10-CM: R41.3 ICD-9-CM: 780.93 Vitals BP Pulse Temp Resp Height(growth percentile) Weight(growth percentile) 130/90 85 98.4 °F (36.9 °C) (Temporal) 18 5' 2\" (1.575 m) 231 lb 9.6 oz (105.1 kg) LMP SpO2 BMI OB Status Smoking Status 01/01/2001 99% 42.36 kg/m2 Hysterectomy Never Smoker BMI and BSA Data Body Mass Index Body Surface Area  
 42.36 kg/m 2 2.14 m 2 Preferred Pharmacy Pharmacy Name Phone [address] [place] [address] [address] [phone] Your Updated Medication List  
  
   
 This list is accurate as of 4/5/18 10:04 AM.  Always use your most recent med list.  
  
  
  
  
 colchicine 0.6 mg tablet Take 1 Tab by mouth two (2) times a day. Indications: PERICARDITIS Diclofenac Potassium 50 mg Pwpk Commonly known as:  CAMBIA Mix one packet in 1-2 ounces of water. Take at headache onset  
  
 escitalopram oxalate 10 mg tablet Commonly known as:  Cami Angst Take 5 mg by mouth daily. LIPITOR 40 mg tablet Generic drug:  atorvastatin Take 20 mg by mouth daily. omeprazole 20 mg capsule Commonly known as:  PRILOSEC Take 1 Cap by mouth daily. To protect stomach while on Motrin QUEtiapine  mg sr tablet Commonly known as:  SEROquel XR Take 150 mg by mouth nightly. Indications: Schizophrenia TOPAMAX 100 mg tablet Generic drug:  topiramate TAKE 1 TABLET EVERY MORNING AND 2 TABLETS AT BEDTIME  
  
 valACYclovir 500 mg tablet Commonly known as:  VALTREX  
TAKE ONE CAPLET BY MOUTH ONCE DAILY APPOINTMENT REQUIRED BEFORE NEXT FILL. VITAMIN B-12 1,000 mcg sublingual tablet Generic drug:  cyanocobalamin Take 1,000 mcg by mouth daily. Prescriptions Sent to Pharmacy Refills Diclofenac Potassium (CAMBIA) 50 mg pwpk 4 Sig: Mix one packet in 1-2 ounces of water. Take at headache onset Class: Normal  
 Pharmacy: [address] [place] [address] #: [phone] TOPAMAX 100 mg tablet 1 Sig: TAKE 1 TABLET EVERY MORNING AND 2 TABLETS AT BEDTIME Class: Normal  
 Pharmacy: [address] [place] [address] #: [phone] We Performed the Following REFERRAL TO NEUROPSYCHOLOGY [IMT31 Custom] Comments:  
 Please evaluate patient for memory loss. To-Do List   
 04/05/2018 Imaging:  DUPLEX CAROTID BILATERAL   
  
 04/05/2018 Neurology:  EEG   
  
 04/05/2018 Imaging:  MRI BRAIN W WO CONT   
  
 04/05/2018 Lab: TSH AND FREE T4   
  
 04/05/2018 Lab:  VITAMIN B12 Referral Information Referral ID Referred By Referred To  
  
 [id number] Imtiaz Fitzpatrick, PHD   
   [address] [place], [place] Phone: [phone] Fax: [phone] Visits Status Start Date End Date 1 New Request 4/5/18 4/5/19 If your referral has a status of pending review or denied, additional information will be sent to support the outcome of this decision. Patient Instructions I have refilled your Topamax. Rain Rai is sent to your pharmacy. Have tests complete and follow up afterwards. Introducing [facility] HE[facility]! Colleen Hawthorne introduces [employer] patient portal. Now you can access parts of your medical record, email your doctor's office, and request medication refills online. 1. In your internet browser, go to [email or web address]. [email or web address]/[email or web address] 2. Click on the First Time User? Click Here link in the Sign In box. You will see the New Member Sign Up page. 3. Enter your [employer] Access Code exactly as it appears below. You will not need to use this code after youve completed the sign-up process. If you do not sign up before the expiration date, you must request a new code. · [employer] Access Code: [email or web address] Expires: 7/4/2018  9:02 AM 
 
4. Enter the last four digits of your Social Security Number (xxxx) and Date of Birth (mm/dd/yyyy) as indicated and click Submit. You will be taken to the next sign-up page. 5. Create a [employer] ID. This will be your [employer] login ID and cannot be changed, so think of one that is secure and easy to remember. 6. Create a [employer] password. You can change your password at any time. 7. Enter your Password Reset Question and Answer. This can be used at a later time if you forget your password. 8. Enter your e-mail address.  You will receive e-mail notification when new information is available in [employer]. 9. Click Sign Up. You can now view and download portions of your medical record. 10. Click the Download Summary menu link to download a portable copy of your medical information. If you have questions, please visit the Frequently Asked Questions section of the [employer] website. Remember, [employer] is NOT to be used for urgent needs. For medical emergencies, dial 911. Now available from your iPhone and Android! Please provide this summary of care documentation to your next provider. Your primary care clinician is listed as Phys Other. If you have any questions after today's visit, please call [phone].

## 2018-04-05 NOTE — PATIENT INSTRUCTIONS
I have refilled your Topamax. Jose Alejandro Armstrong is sent to your pharmacy. Have tests complete and follow up afterwards.

## 2018-04-05 NOTE — PROGRESS NOTES
[address], [address], [place], [address]  [address]  Office:  [phone]  Fax: [phone]  Chief Complaint   Patient presents with    Neurologic Problem     f/u Headaches. This is a 19-year-old female who presents for follow-up appointment. Here to discuss her headaches. She says that her headaches were doing well but then she had surgery in September and headaches seem to increase afterwards. She was previously treated by Dr. Samra Rodriguez. She is maintained on Topamax and was given Vistaril for abortive headache therapy. She said that she would take the Vistaril and the Benadryl. She endorses drowsiness. She denies being on any other abortive headache therapy. Patient endorses history of episodes of of numbness and tingling. This was associated with headache. She endorses history of episodes of lapse in memory. She wonders if this is related to her medications. She says that her family members noticed this. She can move things around the house and forget that she has done that. Denies dangerous behaviors. She is driving with no issues. Previous history of loss of consciousness. Denies subsequent loss of consciousness. She does endorse intermittent lightheadedness. Denies vision loss or visual disturbance. Denies seeing the room spinning. Denies waking up on the floor unsure how she got there. Denies biting her tongue or losing her water in the middle the night. Denies focal deficits. Thymecotomoy in September of 2017 due to cyst. She sees psychiatrist at [facility]. She saw her three months ago. Denies recent medications changes or significant life stressors. Endorses poor sleep. Denies snoring. Denies daytime somnolence.     Past Medical History:   Diagnosis Date    Abdominal pain     Anxiety disorder     panic attacks 7 y ago    Constipation     Crohn's disease ([facility] [address] 4/1/2010    Dizzy spells     Dyslipidemia     Echocardiogram 10/20/11    Normal. EF 60-65% no WMA    Genital HSV 4/1/2010    Headache(784.0)     Hearing reduced     Herpes genitalia     Hypertension     Memory disorder     Migraine     Psychiatric disorder     Schizophrenia ([address]     Seizures ([facility] [address]      none  in 5 years    Syncope     Thymus disorder ([facility] [address]     cyst       Past Surgical History:   Procedure Laterality Date    HX HYSTERECTOMY  2001    HX OTHER SURGICAL      thymectomy    HX TONSILLECTOMY  age 17       Current Outpatient Prescriptions   Medication Sig Dispense Refill    Diclofenac Potassium (CAMBIA) 50 mg pwpk Mix one packet in 1-2 ounces of water. Take at headache onset 12 Packet 4    TOPAMAX 100 mg tablet TAKE 1 TABLET EVERY MORNING AND 2 TABLETS AT BEDTIME 270 Tab 1    omeprazole (PRILOSEC) 20 mg capsule Take 1 Cap by mouth daily. To protect stomach while on Motrin 30 Cap 0    colchicine 0.6 mg tablet Take 1 Tab by mouth two (2) times a day. Indications: PERICARDITIS 60 Tab 2    escitalopram oxalate (LEXAPRO) 10 mg tablet Take 5 mg by mouth daily.  cyanocobalamin (VITAMIN B-12) 1,000 mcg sublingual tablet Take 1,000 mcg by mouth daily.  valACYclovir (VALTREX) 500 mg tablet TAKE ONE CAPLET BY MOUTH ONCE DAILY APPOINTMENT REQUIRED BEFORE NEXT FILL. (Patient taking differently: daily as needed. TAKE ONE CAPLET BY MOUTH ONCE DAILY APPOINTMENT REQUIRED BEFORE NEXT FILL. ) 30 Tab 0    QUEtiapine SR (SEROQUEL XR) 150 mg sr tablet Take 150 mg by mouth nightly. Indications: Schizophrenia      atorvastatin (LIPITOR) 40 mg tablet Take 20 mg by mouth daily. Allergies   Allergen Reactions    Latex Itching     Latex condoms    Zoloft [Sertraline] Other (comments)     Other reaction(s):  Other (comments)  dizziness       Social History   Substance Use Topics    Smoking status: Never Smoker    Smokeless tobacco: Never Used    Alcohol use No       Family History   Problem Relation Age of Onset    Diabetes Mother     Arrhythmia Mother     Heart Disease Mother     Seizures Mother     Other Mother      sleep apnea    Stroke Mother     Headache Mother     Heart Disease Father      MI    Heart Attack Father     Hypertension Father     Stroke Father     Cancer Brother      prostate cancer?  Diabetes Brother     Cancer Maternal Aunt      breast cancer    Schizophrenia Maternal Aunt        Review of Systems  Pertinent positives and negatives as noted otherwise comprehensive review is negative. Examination  Visit Vitals    /90    Pulse 85    Temp 98.4 °F (36.9 °C) (Temporal)    Resp 18    Ht 5' 2\" (1.575 m)    Wt 105.1 kg (231 lb 9.6 oz)    LMP 01/01/2001    SpO2 99%    BMI 42.36 kg/m2     Very pleasant 51-year-old female, well appearing. No icterus. Oropharynx clear. Supple neck without bruit. Heart regular. Neurologically, she is awake, alert, and oriented with normal speech and language. Her cognition is normal.  She is able to discuss her medical history. She is able to discuss her medications. She is able to discuss current events. Intact cranial nerves 2-12. No nystagmus. Visual fields full to confrontation. Disk margins are flat bilaterally. She has normal bulk and tone. She has no abnormal movement. She has no pronation or drift. She generates full strength in the upper and lower extremities. Reflexes are symmetrical in the upper and lower extremities bilaterally. Her toes are down bilaterally. No Fine. Finger nose finger and rapid alternating movements are normal.  Steady gait. Impression/Plan  Nadira Ellsworth is a 41 y.o. female whose history and physical are consistent with chronic headache, lightheadedness, and memory impairment. Patient here for follow-up appointment. Previously seen by Dr. Darrell Dove for chronic headache. She has a significant history numbness and tingling associated with headache.   Also has history of lapses in her memory. Denies dangerous behaviors. Denies focal deficits. Denies waking up on the floor unsure how she got there. Denies biting her tongue or losing her water in the middle the night. Her examination today is reassuringly nonfocal.  She is alert and oriented to person place and time. Recognition intact. Recall 3 out of 3. For the headache at this time we will refill her current dose of Topamax she is taking 100 mg in the morning and 200 mg in the evening. Tolerating this well. She was previously given Vistaril for abortive headache therapy. She said that she was taking this along with Benadryl. We discussed that taking these medications together is not appropriate. We will try a different abortive headache therapy to include Cambia. She is to take this at headache onset. Mixing 1-2 ounces of water. Avoid over-the-counter medications for headache. She presents concerns of her memory. She says that her family has noticed this first.  She has been told that she will go through the house and move something and then forget that she was someone to move it. She denies dangerous behaviors. She denies having any issues driving. Denies recent workup. We will move forward with MRI of the brain looking for any structural abnormality that would be consistent with her symptoms. Obtain carotid Doppler due to her reports of lightheadedness. We will obtain EEG looking for any epileptiform. She does have previous EEGs in [facility] with abnormal findings of sharp waves. She was even previously on Keppra per documentation but this was discontinued because she did not tolerate it. She denies being on any subsequent antiepileptics at this time. She does have significant psychiatric history and is maintained on Seroquel and Lexapro. Recently seen by her psychiatrist 3 months ago who says that she was less inclined to adjust her medications at this time.   For completeness obtain serum labs looking for any metabolic cause for her memory complaint. I have also place referral for formal memory evaluation by neuropsychologist.  Consider in the future, evaluation of her sleep through polysomnogram.  Patient will follow-up after all testing is complete. All questions addressed and patient is agreeable with plan of care. Diagnoses and all orders for this visit:    1. Spells of decreased attentiveness  -     MRI BRAIN W WO CONT; Future  -     DUPLEX CAROTID BILATERAL; Future  -     TSH AND FREE T4; Future  -     VITAMIN B12; Future  -     REFERRAL TO NEUROPSYCHOLOGY  -     EEG; Future    2. Lightheadedness  -     MRI BRAIN W WO CONT; Future  -     DUPLEX CAROTID BILATERAL; Future  -     TSH AND FREE T4; Future  -     VITAMIN B12; Future  -     REFERRAL TO NEUROPSYCHOLOGY  -     EEG; Future    3. Chronic intractable headache, unspecified headache type  -     MRI BRAIN W WO CONT; Future  -     DUPLEX CAROTID BILATERAL; Future  -     TSH AND FREE T4; Future  -     VITAMIN B12; Future  -     REFERRAL TO NEUROPSYCHOLOGY  -     EEG; Future    4. Memory impairment  -     MRI BRAIN W WO CONT; Future  -     DUPLEX CAROTID BILATERAL; Future  -     TSH AND FREE T4; Future  -     VITAMIN B12; Future  -     REFERRAL TO NEUROPSYCHOLOGY  -     EEG; Future    Other orders  -     Diclofenac Potassium (CAMBIA) 50 mg pwpk; Mix one packet in 1-2 ounces of water. Take at headache onset  -     TOPAMAX 100 mg tablet; TAKE 1 TABLET EVERY MORNING AND 2 TABLETS AT BEDTIME      Total time: 45 min   Counseling / coordination time: 37 min   > 50% counseling / coordination?: Yes re: symptoms, management, chart reviewed, coordination, answering questions, and plan of care. Signed By: Can Gonzales NP    This note will not be viewable in [address]

## 2018-04-10 ENCOUNTER — TELEPHONE (OUTPATIENT)
Dept: NEUROLOGY | Age: 48
End: 2018-04-10

## 2018-04-10 NOTE — TELEPHONE ENCOUNTER
Informed patient that the order can be faxed to another facility for the MRI as long as she provides the fax number where it needs to go. Patient verbalized understanding of the instructions given.

## 2018-04-10 NOTE — TELEPHONE ENCOUNTER
Patient requesting to have MRI done at a facility that has an open MRI. Ask for order to faxed to a facility to accommodate. Pt states that due to medical condition is unable to be in a closed space.  Please advise

## 2018-04-30 ENCOUNTER — HOSPITAL ENCOUNTER (OUTPATIENT)
Dept: NEUROLOGY | Age: 48
Discharge: HOME | End: 2018-04-30
Attending: NURSE PRACTITIONER
Payer: MEDICARE

## 2018-04-30 ENCOUNTER — HOSPITAL ENCOUNTER (OUTPATIENT)
Dept: VASCULAR SURGERY | Age: 48
Discharge: HOME | End: 2018-04-30
Attending: NURSE PRACTITIONER
Payer: MEDICARE

## 2018-04-30 ENCOUNTER — HOSPITAL ENCOUNTER (OUTPATIENT)
Dept: LAB | Age: 48
Discharge: HOME | End: 2018-04-30
Attending: NURSE PRACTITIONER
Payer: MEDICARE

## 2018-04-30 DIAGNOSIS — R41.3 MEMORY IMPAIRMENT: ICD-10-CM

## 2018-04-30 DIAGNOSIS — G89.29 CHRONIC INTRACTABLE HEADACHE, UNSPECIFIED HEADACHE TYPE: ICD-10-CM

## 2018-04-30 DIAGNOSIS — R68.89 SPELLS OF DECREASED ATTENTIVENESS: ICD-10-CM

## 2018-04-30 DIAGNOSIS — R42 LIGHTHEADEDNESS: ICD-10-CM

## 2018-04-30 DIAGNOSIS — R51.9 CHRONIC INTRACTABLE HEADACHE, UNSPECIFIED HEADACHE TYPE: ICD-10-CM

## 2018-04-30 LAB
T4 FREE SERPL-MCNC: 0.9 NG/DL (ref 0.7–1.5)
TSH SERPL DL<=0.05 MIU/L-ACNC: 1.08 UIU/ML (ref 0.36–3.74)
VIT B12 SERPL-MCNC: 826 PG/ML (ref 211–911)

## 2018-04-30 PROCEDURE — 82607 VITAMIN B-12: CPT | Performed by: NURSE PRACTITIONER

## 2018-04-30 PROCEDURE — 36415 COLL VENOUS BLD VENIPUNCTURE: CPT | Performed by: NURSE PRACTITIONER

## 2018-04-30 PROCEDURE — 84439 ASSAY OF FREE THYROXINE: CPT | Performed by: NURSE PRACTITIONER

## 2018-04-30 PROCEDURE — 93880 EXTRACRANIAL BILAT STUDY: CPT

## 2018-04-30 PROCEDURE — 95816 EEG AWAKE AND DROWSY: CPT

## 2018-04-30 NOTE — PROCEDURES
[facility]  *** FINAL REPORT ***    Name: Maria Antonia Roberson  MRN: [id number]    Outpatient  : 01 Sep 1970  HIS Order #: [id number]  [address] [facility] Visit #: [id number]  Date: 2018    TYPE OF TEST: Cerebrovascular Duplex    REASON FOR TEST  Spells of decreased attentiveness; Lightheadedness; Chronic  intractable headache, unspecified headache type; Memory impairment    Right Carotid:-             Proximal               Mid                 Distal  cm/s  Systolic  Diastolic  Systolic  Diastolic  Systolic  Diastolic  CCA:    280.8      25.0      111.0      27.0       90.0      25.0  Bulb:  ECA:     49.0       9.0  ICA:     63.0      17.0       58.0      22.0       64.0      20.0  ICA/CCA:  0.6       0.7    ICA Stenosis: Normal    Right Vertebral:-  Finding: Antegrade  Sys:       58.0  Tatum:       23.0    Right Subclavian: Normal    Left Carotid:-            Proximal                Mid                 Distal  cm/s  Systolic  Diastolic  Systolic  Diastolic  Systolic  Diastolic  CCA:     58.2      25.0      114.0      28.0       90.0      20.0  Bulb:  ECA:     84.0      20.0  ICA:     38.0      17.0       59.0      23.0       73.0      37.0  ICA/CCA:  0.6       1.3    ICA Stenosis: Normal    Left Vertebral:-  Finding: Antegrade  Sys:       50.0  Tatum:       14.0    Left Subclavian: Normal    INTERPRETATION/FINDINGS  Duplex images were obtained using 2-D gray scale, color flow, and  spectral Doppler analysis. 1. No evidence of significant arterial occlusive disease in the  internal carotid arteries. 2. No significant stenosis in the external carotid arteries  bilaterally. 3. Antegrade flow in both vertebral arteries. 4. Triphasic doppler signals in both subclavian arteries. ADDITIONAL COMMENTS  No previous exam available for comparison. I have personally reviewed the data relevant to the interpretation of  this  study. TECHNOLOGIST: Heather Abarca RVT  Signed: 2018 08:52 AM    PHYSICIAN: Durga Gonzalez MD  Signed: 05/01/2018 08:31 AM

## 2018-05-05 NOTE — PROCEDURES
Shaw [address]EL[address].  MR#: [id number]  : 1970  ACCOUNT #: [de-identified]   DATE OF SERVICE: 2018    CLINICAL:  This is an apparently wakeful EEG on this 79-year-old patient presenting with headaches. She had some sort of surgery in September. The headaches seem to have gotten worse. MEDICATIONS:  Include Cambia, Topamax, Valtrex, Seroquel. EEG REPORT:  The predominant and apparently wakeful background consists of 20-30 microvolt, sinusoidal and symmetrical, 9-10 Hz waves and attenuate with eye opening. Bifrontal 3-5 microvolt, 16-20 Hz waves are seen which are symmetrical in their occurrence. Eye blink artifact is also noted in the frontal head regions. Step flash photic stimulation was performed that closed driving between 3 and 12 flashes per second. IMPRESSION:  Normal wakeful EEG. No epileptiform or focal abnormality was identified.       MD CALIN Kan / CONG  D: 2018 17:06     T: 2018 14:16  JOB #: [id number]  CC: Jone Hinds NP

## 2018-05-15 ENCOUNTER — TELEPHONE (OUTPATIENT)
Dept: NEUROLOGY | Age: 48
End: 2018-05-15

## 2018-06-15 ENCOUNTER — TELEPHONE (OUTPATIENT)
Dept: NEUROLOGY | Age: 48
End: 2018-06-15

## 2018-08-11 ENCOUNTER — HOSPITAL ENCOUNTER (OUTPATIENT)
Age: 48
Discharge: HOME | End: 2018-08-11
Attending: NURSE PRACTITIONER
Payer: MEDICARE

## 2018-08-11 DIAGNOSIS — R68.89 SPELLS OF DECREASED ATTENTIVENESS: ICD-10-CM

## 2018-08-11 DIAGNOSIS — R41.3 MEMORY IMPAIRMENT: ICD-10-CM

## 2018-08-11 DIAGNOSIS — R51.9 CHRONIC INTRACTABLE HEADACHE, UNSPECIFIED HEADACHE TYPE: ICD-10-CM

## 2018-08-11 DIAGNOSIS — G89.29 CHRONIC INTRACTABLE HEADACHE, UNSPECIFIED HEADACHE TYPE: ICD-10-CM

## 2018-08-11 DIAGNOSIS — R42 LIGHTHEADEDNESS: ICD-10-CM

## 2018-08-11 PROCEDURE — 74011636320 HC RX REV CODE- 636/320: Performed by: NURSE PRACTITIONER

## 2018-08-11 PROCEDURE — A9575 INJ GADOTERATE MEGLUMI 0.1ML: HCPCS | Performed by: NURSE PRACTITIONER

## 2018-08-11 PROCEDURE — 70553 MRI BRAIN STEM W/O & W/DYE: CPT

## 2018-08-11 RX ADMIN — GADOTERATE MEGLUMINE 20 ML: 376.9 INJECTION INTRAVENOUS at 16:00

## 2018-08-21 ENCOUNTER — OFFICE VISIT (OUTPATIENT)
Dept: NEUROLOGY | Age: 48
End: 2018-08-21

## 2018-08-21 VITALS
TEMPERATURE: 97.6 F | BODY MASS INDEX: 43.24 KG/M2 | OXYGEN SATURATION: 99 % | HEIGHT: 62 IN | RESPIRATION RATE: 18 BRPM | HEART RATE: 80 BPM | DIASTOLIC BLOOD PRESSURE: 80 MMHG | WEIGHT: 235 LBS | SYSTOLIC BLOOD PRESSURE: 140 MMHG

## 2018-08-21 DIAGNOSIS — R41.3 MEMORY LOSS: ICD-10-CM

## 2018-08-21 DIAGNOSIS — G44.40 ANALGESIC OVERUSE HEADACHE: ICD-10-CM

## 2018-08-21 DIAGNOSIS — T39.95XA ANALGESIC OVERUSE HEADACHE: ICD-10-CM

## 2018-08-21 DIAGNOSIS — R06.83 SNORING: ICD-10-CM

## 2018-08-21 DIAGNOSIS — R40.0 DAYTIME SOMNOLENCE: ICD-10-CM

## 2018-08-21 DIAGNOSIS — R40.4 TRANSIENT ALTERATION OF AWARENESS: ICD-10-CM

## 2018-08-21 DIAGNOSIS — R51.9 CHRONIC DAILY HEADACHE: Primary | ICD-10-CM

## 2018-08-21 RX ORDER — TOPIRAMATE 100 MG/1
TABLET, COATED ORAL
Qty: 270 TAB | Refills: 3 | Status: SHIPPED | OUTPATIENT
Start: 2018-08-21

## 2018-08-21 NOTE — MR AVS SNAPSHOT
Estrellita Winslow 
 
 
 [facility] [address] 200 [facility] 
[phone] Patient: Rosana No MRN: [id number] [facility]:[birth year] Visit Information Date & Time Provider Department Dept. Phone Encounter #  
 8/21/2018  8:30 AM Ethel Whitlock  [facility] at [address] [place] [address] [id number] Follow-up Instructions Return in about 6 months (around 2/21/2019). Upcoming Health Maintenance Date Due  
 PAP AKA CERVICAL CYTOLOGY 9/22/2017 MEDICARE YEARLY EXAM 3/15/2018 Influenza Age 5 to Adult 8/1/2018 DTaP/Tdap/Td series (2 - Td) 11/20/2024 COLONOSCOPY 7/1/2026 Allergies as of 8/21/2018  Review Complete On: 8/21/2018 By: Isidro Capps LPN Severity Noted Reaction Type Reactions Latex  08/27/2010   Intolerance Itching Latex condoms Zoloft [Sertraline]  01/09/2012   Intolerance Other (comments) Other reaction(s): Other (comments) 
dizziness Current Immunizations  Never Reviewed Name Date Influenza Vaccine 9/19/2014 Tdap 11/20/2014 Not reviewed this visit You Were Diagnosed With   
  
 Codes Comments Chronic daily headache    -  Primary ICD-10-CM: R22 ICD-9-CM: 784.0 Snoring     ICD-10-CM: R06.83 
ICD-9-CM: 786.09 Daytime somnolence     ICD-10-CM: R40.0 ICD-9-CM: 780.54 Analgesic overuse headache     ICD-10-CM: G44.40, T39.95XA ICD-9-CM: 339.3, E935.9 Vitals BP Pulse Temp Resp Height(growth percentile) Weight(growth percentile) 140/80 (BP 1 Location: Left arm, BP Patient Position: Sitting) 80 97.6 °F (36.4 °C) (Oral) 18 5' 2\" (1.575 m) 235 lb (106.6 kg) LMP SpO2 BMI OB Status Smoking Status 01/01/2001 99% 42.98 kg/m2 Hysterectomy Never Smoker Vitals History BMI and BSA Data Body Mass Index Body Surface Area 42.98 kg/m 2 2.16 m 2 Preferred Pharmacy Pharmacy Name Phone [address] [phone] Your Updated Medication List  
  
   
This list is accurate as of 8/21/18  8:40 AM.  Always use your most recent med list.  
  
  
  
  
 escitalopram oxalate 10 mg tablet Commonly known as:  Lauryn Levo Take 5 mg by mouth daily. LIPITOR 40 mg tablet Generic drug:  atorvastatin Take 20 mg by mouth daily. omeprazole 20 mg capsule Commonly known as:  PRILOSEC Take 1 Cap by mouth daily. To protect stomach while on Motrin QUEtiapine  mg sr tablet Commonly known as:  SEROquel XR Take 150 mg by mouth nightly. Indications: Schizophrenia TOPAMAX 100 mg tablet Generic drug:  topiramate TAKE 1 TABLET EVERY MORNING AND 2 TABLETS AT BEDTIME  
  
 valACYclovir 500 mg tablet Commonly known as:  VALTREX  
TAKE ONE CAPLET BY MOUTH ONCE DAILY APPOINTMENT REQUIRED BEFORE NEXT FILL. VITAMIN B-12 1,000 mcg sublingual tablet Generic drug:  cyanocobalamin Take 1,000 mcg by mouth daily. Prescriptions Sent to Pharmacy Refills TOPAMAX 100 mg tablet 3 Sig: TAKE 1 TABLET EVERY MORNING AND 2 TABLETS AT BEDTIME Class: Normal  
 Pharmacy: [address] [place] [address] #: [phone] Follow-up Instructions Return in about 6 months (around 2/21/2019). To-Do List   
 08/21/2018 Sleep Center:  SLEEP STUDY FULL Patient Instructions Please have sleep study complete you will follow up with Dr. Kitty Gallagher if testing is abnormal. 
 
Avoid overuse of over the counter medications for headache such as BC Powder. I have refilled Topamax. Thank you for choosing [facility] and [facility] for your  
 
care. You may receive a survey about your visit. We appreciate you taking time  
 
to complete this survey as we use your feedback to improve our services.   We  
 
 realize we are not perfect, but we strive to provide excellent care. Introducing [facility]! Julienne Nascimento introduces [employer] patient portal. Now you can access parts of your medical record, email your doctor's office, and request medication refills online. 1. In your internet browser, go to [email or web address] 2. Click on the First Time User? Click Here link in the Sign In box. You will see the New Member Sign Up page. 3. Enter your [employer] Access Code exactly as it appears below. You will not need to use this code after youve completed the sign-up process. If you do not sign up before the expiration date, you must request a new code. · [employer] Access Code: [email or web address] Expires: 10/29/2018 12:55 PM 
 
4. Enter the last four digits of your Social Security Number (xxxx) and Date of Birth (mm/dd/yyyy) as indicated and click Submit. You will be taken to the next sign-up page. 5. Create a [employer] ID. This will be your [employer] login ID and cannot be changed, so think of one that is secure and easy to remember. 6. Create a [employer] password. You can change your password at any time. 7. Enter your Password Reset Question and Answer. This can be used at a later time if you forget your password. 8. Enter your e-mail address. You will receive e-mail notification when new information is available in [address] 9. Click Sign Up. You can now view and download portions of your medical record. 10. Click the Download Summary menu link to download a portable copy of your medical information. If you have questions, please visit the Frequently Asked Questions section of the [employer] website. Remember, [employer] is NOT to be used for urgent needs. For medical emergencies, dial 911. Now available from your iPhone and Android! Please provide this summary of care documentation to your next provider. Your primary care clinician is listed as Phys Other.  If you have any questions after today's visit, please call [phone].

## 2018-08-21 NOTE — PROGRESS NOTES
[address], [address], [place], [address]  [facility] [place], [address]  Office:  [phone]  Fax: [phone]  Chief Complaint   Patient presents with    Headache     follow up     This is a 82-year-old female who presents for follow-up headache and memory loss. Endorses headaches continue. She says headaches occur sporadically. She says that she can wake up with a morning headache. She endorses headaches worsen with stress. Positive history of snoring. Positive daytime somnolence. Denies ever having a sleep study done in the past.  She is maintained on Topamax. She is no longer taking Cambia. She endorses routine use of BC powder. Denies any further significant lapses in memory. Denies any dangerous behaviors. She says sometimes she will move something and not remember this. Denies seizure activity. Denies waking up on the floor and sure how she got there. She is routinely seen by a counselor. No new complaints at this time. Here to discuss results.     Past Medical History:   Diagnosis Date    Abdominal pain     Anxiety disorder     panic attacks 7 y ago    Constipation     Crohn's disease ([address] 4/1/2010    Dizzy spells     Dyslipidemia     Echocardiogram 10/20/11    Normal. EF 60-65% no WMA    Genital HSV 4/1/2010    Headache(784.0)     Hearing reduced     Herpes genitalia     Hypertension     Memory disorder     Migraine     Psychiatric disorder     Schizophrenia ([address]     Seizures ([address]      none  in 5 years    Syncope     Thymus disorder ([address]     cyst       Past Surgical History:   Procedure Laterality Date    HX HYSTERECTOMY  2001    HX OTHER SURGICAL      thymectomy    HX TONSILLECTOMY  age 17       Current Outpatient Prescriptions   Medication Sig Dispense Refill    TOPAMAX 100 mg tablet TAKE 1 TABLET EVERY MORNING AND 2 TABLETS AT BEDTIME 270 Tab 3    omeprazole (PRILOSEC) 20 mg capsule Take 1 Cap by mouth daily. To protect stomach while on Motrin 30 Cap 0    escitalopram oxalate (LEXAPRO) 10 mg tablet Take 5 mg by mouth daily.  atorvastatin (LIPITOR) 40 mg tablet Take 20 mg by mouth daily.  cyanocobalamin (VITAMIN B-12) 1,000 mcg sublingual tablet Take 1,000 mcg by mouth daily.  valACYclovir (VALTREX) 500 mg tablet TAKE ONE CAPLET BY MOUTH ONCE DAILY APPOINTMENT REQUIRED BEFORE NEXT FILL. (Patient taking differently: daily as needed. TAKE ONE CAPLET BY MOUTH ONCE DAILY APPOINTMENT REQUIRED BEFORE NEXT FILL. ) 30 Tab 0    QUEtiapine SR (SEROQUEL XR) 150 mg sr tablet Take 150 mg by mouth nightly. Indications: Schizophrenia          Allergies   Allergen Reactions    Latex Itching     Latex condoms    Zoloft [Sertraline] Other (comments)     Other reaction(s): Other (comments)  dizziness       Social History   Substance Use Topics    Smoking status: Never Smoker    Smokeless tobacco: Never Used    Alcohol use No       Family History   Problem Relation Age of Onset    Diabetes Mother     Arrhythmia Mother     Heart Disease Mother     Seizures Mother     Other Mother      sleep apnea    Stroke Mother     Headache Mother     Heart Disease Father      MI    Heart Attack Father     Hypertension Father     Stroke Father     Cancer Brother      prostate cancer?  Diabetes Brother     Cancer Maternal Aunt      breast cancer    Schizophrenia Maternal Aunt        Review of Systems  GENERAL: + Fatigue, denies fever  CARDIAC: No CP or SOB  PULMONARY: No cough of SOB  MUSCULOSKELETAL: No new joint pain  NEURO: SEE HPI    Examination  Visit Vitals    /80 (BP 1 Location: Left arm, BP Patient Position: Sitting)    Pulse 80    Temp 97.6 °F (36.4 °C) (Oral)    Resp 18    Ht 5' 2\" (1.575 m)    Wt 106.6 kg (235 lb)    LMP 01/01/2001    SpO2 99%    BMI 42.98 kg/m2       This is a very pleasant 44-year-old female. She is alert, in NAD. Heart is regular.  Oriented x3, EOM's are full, PERRL, no facial asymmetries. Strength and tone are normal.  DTRs symmetric. No signs of incoordination or ataxia. No results found for this or any previous visit (from the past 24 hour(s)). Impression/Plan  V[place] is a 41 y.o. female whose history and physical are consistent with daily chronic headache and memory loss with transient altered awareness. Patient presents for follow-up headache. Endorses headaches continue. Maintained on Topamax 100 mg in the morning and 200 mg in the evening. Tolerating this well. We will provide refills. We discussed results of diagnostic tests including MRI of the brain with unremarkable findings. Carotid Doppler with no significant stenosis. EEG unremarkable for any epileptiform. Both B12 and thyroid serum labs within normal limits. We discussed findings on neuropsychology evaluation consistent with mild cognitive impairment with amnesic profile. Consideration for for psychological contributions including anxiety and depression. Patient maintained with counselor and has been for several years. Patient denies seizure activity. Denies subsequent episodes of significant concern related to memory. At this juncture I would recommend sleep study for significant history of snoring chronic daily headaches, and daytime somnolence. She should follow-up with Dr. Kade Bowman if testing is abnormal.  We discussed an underlying obstructive sleep apnea can contribute to daily chronic headache, elevated blood pressure and increases future risk of stroke and heart attack. We did discuss analgesic overuse and the propensity for taking something over-the-counter more than twice a week to drive the headache process. We did discuss stress management and the importance of diet and exercise. She was provided headache booklet for further education. Follow-up in 6 months or sooner as needed.   All questions addressed and patient is agreeable with plan of care.    Diagnoses and all orders for this visit:    1. Chronic daily headache  -     SLEEP STUDY FULL (45337); Future    2. Snoring  -     SLEEP STUDY FULL (55112); Future    3. Daytime somnolence  -     SLEEP STUDY FULL (98089); Future    4. Analgesic overuse headache  -     SLEEP STUDY FULL (30874); Future    Other orders  -     TOPAMAX 100 mg tablet; TAKE 1 TABLET EVERY MORNING AND 2 TABLETS AT BEDTIME    Total time: 30 min   Counseling / coordination time: 25 min   > 50% counseling / coordination?: Yes re: symptoms, management, diagnostic test results, recommendations, answering questions, and plan of care. Signed By: Brandy Wilkes NP    This note will not be viewable in [address] PLEASE NOTE:   Portions of this document may have been produced using voice recognition software. Unrecognized errors in transcription may be present.

## 2018-08-21 NOTE — PATIENT INSTRUCTIONS
Please have sleep study complete you will follow up with Dr. Kitty Gallagher if testing is abnormal.    Avoid overuse of over the counter medications for headache such as BC Powder. I have refilled Topamax. Thank you for choosing [facility] and [facility] for your     care. You may receive a survey about your visit. We appreciate you taking time     to complete this survey as we use your feedback to improve our services. We     realize we are not perfect, but we strive to provide excellent care.

## 2019-01-14 ENCOUNTER — HOSPITAL ENCOUNTER (OUTPATIENT)
Dept: SLEEP MEDICINE | Age: 49
Discharge: HOME | End: 2019-01-14
Payer: MEDICARE

## 2019-01-14 VITALS
SYSTOLIC BLOOD PRESSURE: 126 MMHG | BODY MASS INDEX: 44.13 KG/M2 | DIASTOLIC BLOOD PRESSURE: 85 MMHG | WEIGHT: 239.8 LBS | HEIGHT: 62 IN | HEART RATE: 79 BPM

## 2019-01-14 DIAGNOSIS — R06.83 SNORING: ICD-10-CM

## 2019-01-14 DIAGNOSIS — R40.0 DAYTIME SOMNOLENCE: ICD-10-CM

## 2019-01-14 DIAGNOSIS — G44.40 ANALGESIC OVERUSE HEADACHE: ICD-10-CM

## 2019-01-14 DIAGNOSIS — R51.9 CHRONIC DAILY HEADACHE: ICD-10-CM

## 2019-01-14 DIAGNOSIS — T39.95XA ANALGESIC OVERUSE HEADACHE: ICD-10-CM

## 2019-01-14 PROCEDURE — 95810 POLYSOM 6/> YRS 4/> PARAM: CPT

## 2019-02-13 ENCOUNTER — OFFICE VISIT (OUTPATIENT)
Dept: NEUROLOGY | Age: 49
End: 2019-02-13

## 2019-02-13 VITALS
HEIGHT: 62 IN | SYSTOLIC BLOOD PRESSURE: 132 MMHG | TEMPERATURE: 98.5 F | DIASTOLIC BLOOD PRESSURE: 80 MMHG | RESPIRATION RATE: 20 BRPM | OXYGEN SATURATION: 98 % | BODY MASS INDEX: 44.46 KG/M2 | HEART RATE: 76 BPM | WEIGHT: 241.6 LBS

## 2019-02-13 VITALS
WEIGHT: 240 LBS | HEART RATE: 74 BPM | SYSTOLIC BLOOD PRESSURE: 150 MMHG | OXYGEN SATURATION: 98 % | DIASTOLIC BLOOD PRESSURE: 90 MMHG | BODY MASS INDEX: 44.16 KG/M2 | RESPIRATION RATE: 16 BRPM | TEMPERATURE: 98.6 F | HEIGHT: 62 IN

## 2019-02-13 DIAGNOSIS — R51.9 CHRONIC DAILY HEADACHE: Primary | ICD-10-CM

## 2019-02-13 DIAGNOSIS — E66.01 MORBID OBESITY (HCC): ICD-10-CM

## 2019-02-13 DIAGNOSIS — G44.89 OTHER HEADACHE SYNDROME: ICD-10-CM

## 2019-02-13 DIAGNOSIS — G47.8 UPPER AIRWAY RESISTANCE SYNDROME: ICD-10-CM

## 2019-02-13 DIAGNOSIS — G47.10 HYPERSOMNIA: Primary | ICD-10-CM

## 2019-02-13 DIAGNOSIS — T39.95XA ANALGESIC OVERUSE HEADACHE: ICD-10-CM

## 2019-02-13 DIAGNOSIS — G44.40 ANALGESIC OVERUSE HEADACHE: ICD-10-CM

## 2019-02-13 RX ORDER — HYDROCHLOROTHIAZIDE 25 MG/1
25 TABLET ORAL DAILY
COMMUNITY

## 2019-02-13 NOTE — PATIENT INSTRUCTIONS
Taper off Topamax due to side effects of tingling increasing. Take 100 mg in the morning and 100 mg in the evening for 7 days. Then take 1/2 tablet in the morning and 100 mg in the evening for 7 days. Then take 1/2 tablet in the morning and 1/2 tablet in the evening for 14 days. See how you are feeling. If you are doing well continue to taper down. 1/4 tablet in the morning and 1/2 tablet in the evening for 7 days. Then take 1/4 tablet in the morning and 1/4 tablet in the evening for 14 days.

## 2019-02-13 NOTE — PROGRESS NOTES
[facility]  [address] [facility], [address], [place], [address]  [address] [place], [address]  Office:  [phone]  Fax: [phone]  Chief Complaint   Patient presents with    Headache    Medication Evaluation     This is a 53-year-old female who presents for follow-up headaches. In the interim endorses headaches have increased to approximately 5 episodes a month. She says she has had to go to the emergency room in the interim. Continues to take Topamax 100 mg in the morning and 200 mg in the evening. Headache to the top of her head. She feels it can radiate back. Positive for nausea and light sensitivity. Denies vomiting. She complains of numbness that originally was in one arm and then seems to evolve to both upper extremities at intermittent timing. Unsure what brings this on. She says that she can also feel like her tongue at the back is numb. Denies trouble chewing or swallowing. Denies speech disturbance. Denies visual changes, worse headache of her life, weakness, or further focal deficits. Denies any further complaints of lapses in memory. In the past she had complaints of similar numbness and was tapered off of the Topamax when seen by Dr. Baldemar Miranda. She continues to do over-the-counter analgesics including BC powder daily. She continues to take over the counter headache preventatives such as B6 and B12. In the past she said she would take a B12 tablet with the Topamax and would not have the numbness. She is on Lexapro and feels that this dose is not helping her. Earlier today she had a follow-up with Dr. Aline Betts regarding sleep apnea. At this time recommendation is for weight loss and CPAP was not initiated at this time.     Past Medical History:   Diagnosis Date    Abdominal pain     Anxiety disorder     panic attacks 7 y ago    Constipation     Cro[address]'s disease ([facility] 4/1/2010    Dizzy spells     Dyslipidemia     Echocardiogram 10/20/11    Normal. EF 60-65% no WMA    Genital HSV 4/1/2010    Headache(784.0)     Hearing reduced     Herpes genitalia     Hypertension     Memory disorder     Migraine     Psychiatric disorder     Schizophrenia ([facility]     Seizures ([facility]      none  in 5 years    Syncope     Thymus disorder ([facility]     cyst       Past Surgical History:   Procedure Laterality Date    HX HYSTERECTOMY  2001    HX OTHER SURGICAL      thymectomy    HX TONSILLECTOMY  age 17       Current Outpatient Medications   Medication Sig Dispense Refill    hydroCHLOROthiazide (HYDRODIURIL) 25 mg tablet Take 25 mg by mouth daily.  TOPAMAX 100 mg tablet TAKE 1 TABLET EVERY MORNING AND 2 TABLETS AT BEDTIME 270 Tab 3    omeprazole (PRILOSEC) 20 mg capsule Take 1 Cap by mouth daily. To protect stomach while on Motrin 30 Cap 0    escitalopram oxalate (LEXAPRO) 10 mg tablet Take 5 mg by mouth daily.  cyanocobalamin (VITAMIN B-12) 1,000 mcg sublingual tablet Take 1,000 mcg by mouth daily.  valACYclovir (VALTREX) 500 mg tablet TAKE ONE CAPLET BY MOUTH ONCE DAILY APPOINTMENT REQUIRED BEFORE NEXT FILL. (Patient taking differently: daily as needed. TAKE ONE CAPLET BY MOUTH ONCE DAILY APPOINTMENT REQUIRED BEFORE NEXT FILL. ) 30 Tab 0    QUEtiapine SR (SEROQUEL XR) 150 mg sr tablet Take 150 mg by mouth nightly. Indications: Schizophrenia      atorvastatin (LIPITOR) 40 mg tablet Take 20 mg by mouth daily. Allergies   Allergen Reactions    Latex Itching     Latex condoms    Latex, Natural Rubber Hives and Itching    Sertraline Other (comments)     Other reaction(s): Other (comments)  dizziness  Other reaction(s):  Other (comments)         Social History     Tobacco Use    Smoking status: Never Smoker    Smokeless tobacco: Never Used   Substance Use Topics    Alcohol use: No    Drug use: No       Family History   Problem Relation Age of Onset    Diabetes Mother     Arrhythmia Mother     Heart Disease Mother    [facility] Seizures Mother     Other Mother         sleep apnea    Stroke Mother     Headache Mother     Heart Disease Father         MI    Heart Attack Father     Hypertension Father     Stroke Father     Cancer Brother         prostate cancer?  Diabetes Brother     Cancer Maternal Aunt         breast cancer    Schizophrenia Maternal Aunt        Review of Systems  GENERAL: Denies fever or fatigue  CARDIAC: No CP or SOB  PULMONARY: No cough of SOB  MUSCULOSKELETAL: No new joint pain  NEURO: SEE HPI    Examination  Visit Vitals  /80 (BP 1 Location: Left arm, BP Patient Position: Sitting)   Pulse 76   Temp 98.5 °F (36.9 °C) (Oral)   Resp 20   Ht 5' 2\" (1.575 m)   Wt 109.6 kg (241 lb 9.6 oz)   LMP 01/01/2001   SpO2 98%   BMI 44.19 kg/m²       This is a very pleasant 46-year-old female she has alert and in NAD. Heart is regular. Oriented x3, EOM's are full, PERRL, no facial asymmetries. Strength and tone are normal. DTR's +2, gait symmetric     No results found for this or any previous visit (from the past 24 hour(s)). Impression/Plan  Raoul Stiles is a 57 y.o. female whose history and physical are consistent with headache disorder. Patient presents for follow-up headache. Continues to have headaches. Approximately 5 months. Continues to take Topamax 100 mg in the morning and 200 mg in the evening. Complains of numbness similar to previous when looking back in the records. Dr. Raymond President had tapered her off the Topamax in 2017 for similar complaints of numbness. At this time she feels the Topamax is not helping with her headaches. We can certainly taper down the dose and reassess in 8-10 weeks. Encouraged her to avoid over-the-counter analgesics. We discussed analgesic overuse headache. Patient verbalized understanding. Defer additional testing at this time. Previous evaluation includes MRI of the brain unremarkable from August 2018. Follow-up in 2 months.   All questions addressed and patient is agreeable with plan of care. Diagnoses and all orders for this visit:    1. Chronic daily headache    2. Analgesic overuse headache    Total time: 25 min   Counseling / coordination time: 18 min   > 50% counseling / coordination?: Yes re: symptoms, management, medications, answering questions, and plan of care. Signed By: Emeterio Galan NP    This note will not be viewable in [address] PLEASE NOTE:   Portions of this document may have been produced using voice recognition software. Unrecognized errors in transcription may be present.

## 2019-02-13 NOTE — PROGRESS NOTES
[employer] [address] [facility], [address] [facility] Kacie Ramirez, [address] Almita Camacho [facility], [place], [address] Office:  [phone]  Fax: [phone] CC: EDS, snoring HPI: David Spann is a 72-year-old right-handed female who presents in follow-up. She is a patient of Alona Ramirez NP for headaches and memory loss. She recently underwent sleep study and is here to follow up for results. She is on topiramate for headaches. She  was having lapses in memory and underwent neuropsych testing which was consistent with mild cognitive impairment with amnestic profile, with consideration for psychological contributions including anxiety and depression. She sees a counselor. Sleep study was obtained due to her snoring, chronic daily headaches, and daytime somnolence. She typically goes to bed at 11pm, May take her sometime to fall asleep. She tosses and turns, she is up at 7 to 7:30am feeling tired, not refreshed. She drinks caffeine throughout the day into the evening. She has a TV in bedroom,  watches it. She does not drink, does no smoke, no drugs. Does not eat close to bedtime, She does not nap. Past Medical History:  
Diagnosis Date  Abdominal pain  Anxiety disorder   
 panic attacks 7 y ago  Constipation  Crohn's disease ([address] 4/1/2010  Dizzy spells  Dyslipidemia  Echocardiogram 10/20/11 Normal. EF 60-65% no WMA  Genital HSV 4/1/2010  
 Headache(784.0)  Hearing reduced  Herpes genitalia  Hypertension  Memory disorder  Migraine  Psychiatric disorder  Schizophrenia ([address]  Seizures ([address]   
  none  in 5 years  Syncope  Thymus disorder ([address] cyst  
 
 
Past Surgical History:  
Procedure Laterality Date  HX HYSTERECTOMY  2001  HX OTHER SURGICAL    
 thymectomy  HX TONSILLECTOMY  age 17 Current Outpatient Medications Medication Sig Dispense Refill  TOPAMAX 100 mg tablet TAKE 1 TABLET EVERY MORNING AND 2 TABLETS AT BEDTIME 270 Tab 3  
 omeprazole (PRILOSEC) 20 mg capsule Take 1 Cap by mouth daily. To protect stomach while on Motrin 30 Cap 0  
 escitalopram oxalate (LEXAPRO) 10 mg tablet Take 5 mg by mouth daily.  atorvastatin (LIPITOR) 40 mg tablet Take 20 mg by mouth daily.  cyanocobalamin (VITAMIN B-12) 1,000 mcg sublingual tablet Take 1,000 mcg by mouth daily.  valACYclovir (VALTREX) 500 mg tablet TAKE ONE CAPLET BY MOUTH ONCE DAILY APPOINTMENT REQUIRED BEFORE NEXT FILL. (Patient taking differently: daily as needed. TAKE ONE CAPLET BY MOUTH ONCE DAILY APPOINTMENT REQUIRED BEFORE NEXT FILL. ) 30 Tab 0  
 QUEtiapine SR (SEROQUEL XR) 150 mg sr tablet Take 150 mg by mouth nightly. Indications: Schizophrenia Allergies Allergen Reactions  Latex Itching Latex condoms  Zoloft [Sertraline] Other (comments) Other reaction(s): Other (comments) 
dizziness Social History Tobacco Use  Smoking status: Never Smoker  Smokeless tobacco: Never Used Substance Use Topics  Alcohol use: No  
 Drug use: No  
 
 
Family History Problem Relation Age of Onset  Diabetes Mother  Arrhythmia Mother  Heart Disease Mother  Seizures Mother  Other Mother   
     sleep apnea  Stroke Mother  Headache Mother  Heart Disease Father MI  
 Heart Attack Father  Hypertension Father  Stroke Father  Cancer Brother   
     prostate cancer?  Diabetes Brother  Cancer Maternal Aunt   
     breast cancer  Schizophrenia Maternal Aunt Review of Systems: 
GENERAL: Denies fever, reports fatigue CARDIAC: No CP or SOB PULMONARY: No cough of SOB MUSCULOSKELETAL: No new joint pain NEURO: SEE HPI Physical Examination: 
Visit Vitals LMP 01/01/2001 BP : 150/90 P: 72  RR: 18  Wt 240lbs    Ht: 5'2\" Alert, in NAD. Obese, Crowded oropharynx, Mallampati IV. Heart is regular. Oriented x3, EOMs are full, PERRL, no facial asymmetry. Strength and tone are normal. DTRs +2, gait symmetric. PSG showed AHI of 2.0, REM AHi of 12, desats to 85% Impression/Plan: 
Upper airway resistance syndrome, with REM specific TASNEEM. Counseled her about weight loss, supine sleep avoidance, and TASNEEM treatment options. She is not motivated to try CPAP and her overall AHI at 2 does not imminently justify it. Counseled pt about sleep hygiene, including predictable bedtimes and rise times, avoidance of late meals near bedtime, no TV in bedroom, to get out of room if unable to fall asleep after 10-15 mins, and no napping. Recommended a repeat PSG if symptomatic in a year. This note will not be viewable in [address] PLEASE NOTE:  
Portions of this document may have been produced using voice recognition software. Unrecognized errors in transcription may be present.

## 2019-02-13 NOTE — PROGRESS NOTES
Slade Voss is a 57 y.o. female in today for follow-up on HAs and medication review. Learning assessment previously completed 4/30/2013; primary language is [place]. 1. Have you been to the ER, urgent care clinic since your last visit? Hospitalized since your last visit? Yes Reason for visit: [facility] Zamorano, [place] ED 11/27/2018 dizziness and [facility] 1/21/2019 abdominal pain    2. Have you seen or consulted any other health care providers outside of the [address] [place] [address] since your last visit? Include any pap smears or colon screening.  No

## 2019-02-13 NOTE — PROGRESS NOTES
Chief Complaint Patient presents with  Sleep Study  
  follow up  
 
3 most recent PHQ Screens 2/13/2019 Little interest or pleasure in doing things Not at all Feeling down, depressed, irritable, or hopeless Not at all Total Score PHQ 2 0

## 2019-02-13 NOTE — PROGRESS NOTES
[facility] [address] [facility], 609 [facility] Kacie Rmairez, [id number] [facility], [place], [address] Office:  [phone]  Fax: [phone] No chief complaint on file. HPI: Odessa Hassan is a 61-year-old right-handed female who presents in follow-up. She is a patient of Alexander Mackay NP for headaches and memory loss. She recently underwent sleep study and is here to follow up for results. She is on topiramate for headaches. She  was having lapses in memory and underwent neuropsych testing which was consistent with mild cognitive impairment with amnestic profile, with consideration for psychological contributions including anxiety and depression. She sees a counselor. Sleep study was obtained due to her snoring, chronic daily headaches, and daytime somnolence. Past Medical History:  
Diagnosis Date  Abdominal pain  Anxiety disorder   
 panic attacks 7 y ago  Constipation  Crohn's disease ([address] 4/1/2010  Dizzy spells  Dyslipidemia  Echocardiogram 10/20/11 Normal. EF 60-65% no WMA  Genital HSV 4/1/2010  
 Headache(784.0)  Hearing reduced  Herpes genitalia  Hypertension  Memory disorder  Migraine  Psychiatric disorder  Schizophrenia ([address]  Seizures ([address]   
  none  in 5 years  Syncope  Thymus disorder ([address] cyst  
 
 
Past Surgical History:  
Procedure Laterality Date  HX HYSTERECTOMY  2001  HX OTHER SURGICAL    
 thymectomy  HX TONSILLECTOMY  age 17 Current Outpatient Medications Medication Sig Dispense Refill  TOPAMAX 100 mg tablet TAKE 1 TABLET EVERY MORNING AND 2 TABLETS AT BEDTIME 270 Tab 3  
 omeprazole (PRILOSEC) 20 mg capsule Take 1 Cap by mouth daily. To protect stomach while on Motrin 30 Cap 0  
 escitalopram oxalate (LEXAPRO) 10 mg tablet Take 5 mg by mouth daily.  atorvastatin (LIPITOR) 40 mg tablet Take 20 mg by mouth daily.  cyanocobalamin (VITAMIN B-12) 1,000 mcg sublingual tablet Take 1,000 mcg by mouth daily.  valACYclovir (VALTREX) 500 mg tablet TAKE ONE CAPLET BY MOUTH ONCE DAILY APPOINTMENT REQUIRED BEFORE NEXT FILL. (Patient taking differently: daily as needed. TAKE ONE CAPLET BY MOUTH ONCE DAILY APPOINTMENT REQUIRED BEFORE NEXT FILL. ) 30 Tab 0  
 QUEtiapine SR (SEROQUEL XR) 150 mg sr tablet Take 150 mg by mouth nightly. Indications: Schizophrenia Allergies Allergen Reactions  Latex Itching Latex condoms  Zoloft [Sertraline] Other (comments) Other reaction(s): Other (comments) 
dizziness Social History Tobacco Use  Smoking status: Never Smoker  Smokeless tobacco: Never Used Substance Use Topics  Alcohol use: No  
 Drug use: No  
 
 
Family History Problem Relation Age of Onset  Diabetes Mother  Arrhythmia Mother  Heart Disease Mother  Seizures Mother  Other Mother   
     sleep apnea  Stroke Mother  Headache Mother  Heart Disease Father MI  
 Heart Attack Father  Hypertension Father  Stroke Father  Cancer Brother   
     prostate cancer?  Diabetes Brother  Cancer Maternal Aunt   
     breast cancer  Schizophrenia Maternal Aunt Review of Systems: 
GENERAL: Denies fever or fatigue CARDIAC: No CP or SOB PULMONARY: No cough of SOB MUSCULOSKELETAL: No new joint pain NEURO: SEE HPI Physical Examination: 
Visit Vitals LMP 01/01/2001 Alert, in NAD. Heart is regular. Oriented x3, EOMs are full, PERRL, no facial asymmetry. Strength and tone are normal. DTRs +2, gait symmetric. No results found for this or any previous visit (from the past 24 hour(s)). Impression/Plan: Geroldine Pallas is a 57 y.o. female whose history and physical are consistent with ***. {There are no diagnoses linked to this encounter. (Refresh or delete this SmartLink)} Signed By: Mera Nunez NP   
 
 This note will not be viewable in [address] PLEASE NOTE:  
Portions of this document may have been produced using voice recognition software. Unrecognized errors in transcription may be present.

## 2020-01-20 ENCOUNTER — IMPORTED ENCOUNTER (OUTPATIENT)
Dept: URBAN - NONMETROPOLITAN AREA CLINIC 1 | Facility: CLINIC | Age: 50
End: 2020-01-20

## 2020-01-20 PROBLEM — H52.13: Noted: 2020-01-20

## 2020-01-20 PROCEDURE — 92310 CONTACT LENS FITTING OU: CPT

## 2020-01-20 PROCEDURE — S0621 ROUTINE OPHTHALMOLOGICAL EXA: HCPCS

## 2020-01-20 PROCEDURE — 92015 DETERMINE REFRACTIVE STATE: CPT

## 2020-01-20 NOTE — PATIENT DISCUSSION
Myopia-Discussed diagnosis with patient. -Explained that people who are myopic are at a higher risk for developing RD/RT and reviewed associated S&S.-Pt to contact our office if symptoms develop. Presbyopia-Discussed diagnosis with patient. Updated spec Rx given. Recommend lens that will provide comfort as well as protect safety and health of eyes. CL wear-CLs fit and center well.-Stressed that patient should not sleep in CL. -Updated CL Rx given. -CL care and precautions given. trials given for DVOPT TO TRY THEN CALL FOR RX

## 2022-04-09 ASSESSMENT — VISUAL ACUITY
OS_SC: 20/30
OD_SC: 20/20-2

## 2022-04-09 ASSESSMENT — TONOMETRY
OS_IOP_MMHG: 18
OD_IOP_MMHG: 18